# Patient Record
Sex: MALE
[De-identification: names, ages, dates, MRNs, and addresses within clinical notes are randomized per-mention and may not be internally consistent; named-entity substitution may affect disease eponyms.]

---

## 2017-01-28 ENCOUNTER — CHARTING TRANS (OUTPATIENT)
Dept: OTHER | Age: 57
End: 2017-01-28

## 2018-11-05 VITALS
OXYGEN SATURATION: 98 % | HEIGHT: 73 IN | SYSTOLIC BLOOD PRESSURE: 110 MMHG | DIASTOLIC BLOOD PRESSURE: 80 MMHG | HEART RATE: 91 BPM | RESPIRATION RATE: 16 BRPM

## 2018-11-20 ENCOUNTER — LAB ENCOUNTER (OUTPATIENT)
Dept: LAB | Age: 58
End: 2018-11-20
Attending: FAMILY MEDICINE
Payer: COMMERCIAL

## 2018-11-20 ENCOUNTER — OFFICE VISIT (OUTPATIENT)
Dept: FAMILY MEDICINE CLINIC | Facility: CLINIC | Age: 58
End: 2018-11-20
Payer: COMMERCIAL

## 2018-11-20 VITALS
RESPIRATION RATE: 16 BRPM | DIASTOLIC BLOOD PRESSURE: 52 MMHG | WEIGHT: 173 LBS | HEART RATE: 89 BPM | SYSTOLIC BLOOD PRESSURE: 98 MMHG | TEMPERATURE: 97 F | BODY MASS INDEX: 22.2 KG/M2 | HEIGHT: 74 IN

## 2018-11-20 DIAGNOSIS — R79.89 ELEVATED TSH: Primary | ICD-10-CM

## 2018-11-20 DIAGNOSIS — Z00.00 LABORATORY EXAMINATION ORDERED AS PART OF A ROUTINE GENERAL MEDICAL EXAMINATION: ICD-10-CM

## 2018-11-20 DIAGNOSIS — Z12.5 SCREENING FOR PROSTATE CANCER: ICD-10-CM

## 2018-11-20 DIAGNOSIS — R79.89 ELEVATED TSH: ICD-10-CM

## 2018-11-20 DIAGNOSIS — R20.2 NUMBNESS AND TINGLING OF LEFT HAND: ICD-10-CM

## 2018-11-20 DIAGNOSIS — R20.0 NUMBNESS AND TINGLING OF LEFT HAND: ICD-10-CM

## 2018-11-20 DIAGNOSIS — Z71.6 ENCOUNTER FOR SMOKING CESSATION COUNSELING: ICD-10-CM

## 2018-11-20 DIAGNOSIS — Z00.00 PHYSICAL EXAM, ANNUAL: Primary | ICD-10-CM

## 2018-11-20 DIAGNOSIS — Z13.89 SCREENING FOR GENITOURINARY CONDITION: ICD-10-CM

## 2018-11-20 PROCEDURE — 36415 COLL VENOUS BLD VENIPUNCTURE: CPT

## 2018-11-20 PROCEDURE — 82607 VITAMIN B-12: CPT

## 2018-11-20 PROCEDURE — 99386 PREV VISIT NEW AGE 40-64: CPT | Performed by: FAMILY MEDICINE

## 2018-11-20 PROCEDURE — 80061 LIPID PANEL: CPT

## 2018-11-20 PROCEDURE — 81001 URINALYSIS AUTO W/SCOPE: CPT

## 2018-11-20 PROCEDURE — 84481 FREE ASSAY (FT-3): CPT

## 2018-11-20 PROCEDURE — 80053 COMPREHEN METABOLIC PANEL: CPT

## 2018-11-20 PROCEDURE — 84443 ASSAY THYROID STIM HORMONE: CPT

## 2018-11-20 PROCEDURE — 85025 COMPLETE CBC W/AUTO DIFF WBC: CPT

## 2018-11-20 PROCEDURE — 84439 ASSAY OF FREE THYROXINE: CPT

## 2018-11-20 PROCEDURE — 84153 ASSAY OF PSA TOTAL: CPT

## 2018-11-20 NOTE — PROGRESS NOTES
Marcio Vaughan is a 62year old male who presents for a complete physical exam.   HPI:   Pt complains of of having on and off back pain issues. Will do some stretching exercises he will follow-up in the office of back pain persists.   Patient also compla EXCISION OF LESION/LIPOMA Right 8/19/2013    Performed by Rishi Maciel MD at [facility] MAIN OR   • HAND/FINGER SURGERY UNLISTED      27 y ago right thumb surgery   • KNEE SURGERY  2/2013   • OTHER SURGICAL HISTORY      Ear Surgery, Right ear after contusion apparent distress  SKIN: no rashes,no suspicious lesions  HEENT: atraumatic, normocephalic,ears and throat are clear  EYES:PERRLA, EOMI, conjunctiva are clear  NECK: supple,no adenopathy,  CHEST: no chest tenderness  BREAST: no dominant or suspicious mass maintenance, will check fasting Lipids, CMP, CBC and PSA. Pt up-to-date with screening colonoscopy. The patient indicates understanding of these issues and agrees to the plan. The patient is asked to return for CPX in 1 year.   Follow-up sooner to discuss

## 2018-11-20 NOTE — PATIENT INSTRUCTIONS
Healthy diet. Stay active. Start Chantix intake per directions. If you want to continue after starter pack call office for refills. Do Some back stretching exercises .    you can try  Advil 200 mg 2 tablets with breakfast lunch and dinner just for 7 day

## 2019-02-25 ENCOUNTER — OFFICE VISIT (OUTPATIENT)
Dept: FAMILY MEDICINE CLINIC | Facility: CLINIC | Age: 59
End: 2019-02-25
Payer: COMMERCIAL

## 2019-02-25 ENCOUNTER — HOSPITAL ENCOUNTER (OUTPATIENT)
Dept: GENERAL RADIOLOGY | Age: 59
Discharge: HOME | End: 2019-02-25
Attending: FAMILY MEDICINE
Payer: COMMERCIAL

## 2019-02-25 VITALS
WEIGHT: 180 LBS | TEMPERATURE: 97 F | HEART RATE: 62 BPM | BODY MASS INDEX: 23.1 KG/M2 | HEIGHT: 74 IN | DIASTOLIC BLOOD PRESSURE: 62 MMHG | RESPIRATION RATE: 16 BRPM | SYSTOLIC BLOOD PRESSURE: 110 MMHG

## 2019-02-25 DIAGNOSIS — G89.29 CHRONIC RIGHT-SIDED LOW BACK PAIN WITHOUT SCIATICA: Primary | ICD-10-CM

## 2019-02-25 DIAGNOSIS — G89.29 CHRONIC RIGHT-SIDED LOW BACK PAIN WITHOUT SCIATICA: ICD-10-CM

## 2019-02-25 DIAGNOSIS — M54.50 CHRONIC RIGHT-SIDED LOW BACK PAIN WITHOUT SCIATICA: Primary | ICD-10-CM

## 2019-02-25 DIAGNOSIS — M54.50 CHRONIC RIGHT-SIDED LOW BACK PAIN WITHOUT SCIATICA: ICD-10-CM

## 2019-02-25 PROCEDURE — 72110 X-RAY EXAM L-2 SPINE 4/>VWS: CPT | Performed by: FAMILY MEDICINE

## 2019-02-25 PROCEDURE — 99214 OFFICE O/P EST MOD 30 MIN: CPT | Performed by: FAMILY MEDICINE

## 2019-02-25 NOTE — PATIENT INSTRUCTIONS
Do x-ray of your lumbar spine. Schedule physical therapy at [facility]. Use Advil over-the-counter as needed.     Follow-up after physical therapy in the office for reevaluation

## 2019-02-25 NOTE — PROGRESS NOTES
Jonna Lyle is a 62year old male. cc low back pain   HPI:   Patient complains of having low back pain on and off for 3 years started after he was lifting a case of water. The pain is not constant.   It could be 8-9 out of 10 intensity then it tapers d adenopathy,  LUNGS: clear to auscultation  CARDIO: RRR without murmur  GI: good BS's,no masses, HSM or tenderness  EXTREMITIES: no cyanosis, clubbing or edema  Psychiatric - alert  and oriented x3, normal mood     ASSESSMENT AND PLAN:     Chronic right-lucia

## 2019-07-27 ENCOUNTER — APPOINTMENT (OUTPATIENT)
Dept: GENERAL RADIOLOGY | Age: 59
End: 2019-07-27
Attending: FAMILY MEDICINE
Payer: COMMERCIAL

## 2019-07-27 ENCOUNTER — HOSPITAL ENCOUNTER (OUTPATIENT)
Age: 59
Discharge: HOME | End: 2019-07-27
Attending: FAMILY MEDICINE
Payer: COMMERCIAL

## 2019-07-27 VITALS
RESPIRATION RATE: 18 BRPM | WEIGHT: 175 LBS | HEART RATE: 60 BPM | SYSTOLIC BLOOD PRESSURE: 119 MMHG | TEMPERATURE: 98 F | HEIGHT: 73 IN | DIASTOLIC BLOOD PRESSURE: 72 MMHG | BODY MASS INDEX: 23.19 KG/M2 | OXYGEN SATURATION: 97 %

## 2019-07-27 DIAGNOSIS — S81.011A LACERATION OF RIGHT KNEE, INITIAL ENCOUNTER: Primary | ICD-10-CM

## 2019-07-27 PROCEDURE — 12032 INTMD RPR S/A/T/EXT 2.6-7.5: CPT

## 2019-07-27 PROCEDURE — 99203 OFFICE O/P NEW LOW 30 MIN: CPT

## 2019-07-27 PROCEDURE — 73560 X-RAY EXAM OF KNEE 1 OR 2: CPT | Performed by: FAMILY MEDICINE

## 2019-07-27 PROCEDURE — 99213 OFFICE O/P EST LOW 20 MIN: CPT

## 2019-07-27 NOTE — ED PROVIDER NOTES
Patient Seen in: [facility]    History   Patient presents with:  Trauma    Stated Complaint: Cut knee possible stitches today    HPI    27-year-old male presents today for evaluation of her right anterior knee laceration th Physical Exam   Musculoskeletal:        Legs:      Patient is alert oriented in no distress  Examination of the right knee there is 5 cm gaping oblique laceration appreciated to the lateral  suprapatellar region with subcutaneous tissue and fat laceration Procedure note after discussing the risks, complications area of the laceration wound was infiltrated with 2% lidocaine without epinephrine. About 2 cc infiltrated locally.   Wound explored, there is a small foreign body likely small piece of dried grass p

## 2019-07-29 NOTE — ED NOTES
Pt and his wife call asking for pain meds for Knee LAC.  Pt states he has had trouble walking and has been using crutches at home due to the pain, states the stitches look good (denies any drainage, redness or streaking), and knee has swollen up to the size

## 2019-08-02 ENCOUNTER — APPOINTMENT (OUTPATIENT)
Dept: GENERAL RADIOLOGY | Facility: HOSPITAL | Age: 59
End: 2019-08-02
Attending: NURSE PRACTITIONER
Payer: COMMERCIAL

## 2019-08-02 ENCOUNTER — HOSPITAL ENCOUNTER (EMERGENCY)
Facility: HOSPITAL | Age: 59
Discharge: HOME | End: 2019-08-02
Attending: EMERGENCY MEDICINE
Payer: COMMERCIAL

## 2019-08-02 ENCOUNTER — TELEPHONE (OUTPATIENT)
Dept: FAMILY MEDICINE CLINIC | Facility: CLINIC | Age: 59
End: 2019-08-02

## 2019-08-02 VITALS
DIASTOLIC BLOOD PRESSURE: 77 MMHG | TEMPERATURE: 99 F | RESPIRATION RATE: 18 BRPM | SYSTOLIC BLOOD PRESSURE: 122 MMHG | HEART RATE: 64 BPM | BODY MASS INDEX: 23 KG/M2 | WEIGHT: 175 LBS | OXYGEN SATURATION: 97 %

## 2019-08-02 DIAGNOSIS — M25.461 FLUID IN RIGHT KNEE JOINT: Primary | ICD-10-CM

## 2019-08-02 LAB
BASOPHIL SYNOVIAL FLUID: 0 %
CRYSTALS: NEGATIVE
EOSINOPHIL SYNOVIAL FLUID: 0 %
LYMPH SYNOVIAL FLUID: 8 %
MON/MAC/HIST SYNOVIAL FLUID: 4 %
NEUTROPHIL SYNOVIAL FLUID: 88 % (ref ?–20)
RBC SYNOVIAL FLUID: ABNORMAL /MM3
TOTAL CELLS COUNTED: 100
WBC SYNOVIAL FLUID: ABNORMAL /MM3 (ref ?–150)

## 2019-08-02 PROCEDURE — 89050 BODY FLUID CELL COUNT: CPT | Performed by: EMERGENCY MEDICINE

## 2019-08-02 PROCEDURE — 87205 SMEAR GRAM STAIN: CPT | Performed by: EMERGENCY MEDICINE

## 2019-08-02 PROCEDURE — 87077 CULTURE AEROBIC IDENTIFY: CPT | Performed by: EMERGENCY MEDICINE

## 2019-08-02 PROCEDURE — 20610 DRAIN/INJ JOINT/BURSA W/O US: CPT | Performed by: NURSE PRACTITIONER

## 2019-08-02 PROCEDURE — 99284 EMERGENCY DEPT VISIT MOD MDM: CPT

## 2019-08-02 PROCEDURE — 87186 SC STD MICRODIL/AGAR DIL: CPT | Performed by: EMERGENCY MEDICINE

## 2019-08-02 PROCEDURE — 87070 CULTURE OTHR SPECIMN AEROBIC: CPT | Performed by: EMERGENCY MEDICINE

## 2019-08-02 PROCEDURE — 89051 BODY FLUID CELL COUNT: CPT | Performed by: EMERGENCY MEDICINE

## 2019-08-02 PROCEDURE — 89060 EXAM SYNOVIAL FLUID CRYSTALS: CPT | Performed by: EMERGENCY MEDICINE

## 2019-08-02 PROCEDURE — 20610 DRAIN/INJ JOINT/BURSA W/O US: CPT

## 2019-08-02 RX ORDER — LIDOCAINE HYDROCHLORIDE AND EPINEPHRINE 10; 10 MG/ML; UG/ML
5 INJECTION, SOLUTION INFILTRATION; PERINEURAL ONCE
Status: DISCONTINUED | OUTPATIENT
Start: 2019-08-02 | End: 2019-08-02

## 2019-08-02 NOTE — TELEPHONE ENCOUNTER
S/w wife. Pt was in urgent care last week for laceration with hatchet while chopping wood. rec'd 8 sutures. Reports swelling around knee is worse and notes yellowing of the skin 8 inches above and 8 inches below.   When asking if fever, she said she does

## 2019-08-02 NOTE — TELEPHONE ENCOUNTER
1. What are your symptoms? Went to [facility] for cut/trauma to knee states knee is still very swollen and yellow and tender does get cold and hot does have 8 stitches     2. How long have you been having these symptoms? Since sat.      3. Have you done a

## 2019-08-02 NOTE — ED NOTES
Right knee drainage and aspiration done by MD, RN and APN at the bedside. About 100ml of fluid aspirated.

## 2019-08-02 NOTE — ED INITIAL ASSESSMENT (HPI)
Pt states that he hit his knee with an ax last sat and received stitches.  3 hours after the accident pt states that he knee swelled and had to start using crutches

## 2019-08-02 NOTE — ED NOTES
I reviewed that chart and discussed the case. I have examined the patient and noted the patient states that that he hit his knee with an accident last Saturday and received stitches. He did have x-rays at that time.     Xr Knee (1 Or 2 Views), Right (cpt= Subsequent an 18-gauge needle was used under sterile conditions and approximately  100 cc of blood-tinged serous sanguinous fluid was obtained. .  The patient tolerated procedure without complication he felt significantly better was able to bend the knee wi

## 2019-08-02 NOTE — ED PROVIDER NOTES
Patient Seen in: [facility] Emergency Department    History   Patient presents with:  Lower Extremity Injury (musculoskeletal)  Swelling Edema (cardiovascular, metabolic)    Stated Complaint: pain and swelling to right knee s/p injury/suture placeme Musculoskeletal:        Knee swelling   Neurological: Negative. Hematological: Negative. Psychiatric/Behavioral: Negative. All other systems reviewed and are negative.       Positive for stated complaint: pain and swelling to right knee s/p injury/ The following orders were created for panel order CELL CT/DIF & CRYSTAL SYNOVIAL.   Procedure                               Abnormality         Status                     ---------                               -----------         ------ I have discussed with the patient and/or family the results of test, differential diagnosis, treatment plan, warning signs and symptoms which should prompt immediate return.   The patient and/or family expressed clear understanding of these instructions and

## 2019-08-05 ENCOUNTER — HOSPITAL ENCOUNTER (INPATIENT)
Facility: HOSPITAL | Age: 59
LOS: 2 days | Discharge: HOME | DRG: 487 | End: 2019-08-07
Attending: EMERGENCY MEDICINE | Admitting: HOSPITALIST
Payer: COMMERCIAL

## 2019-08-05 ENCOUNTER — TELEPHONE (OUTPATIENT)
Dept: FAMILY MEDICINE CLINIC | Facility: CLINIC | Age: 59
End: 2019-08-05

## 2019-08-05 DIAGNOSIS — M00.861 ARTHRITIS OF RIGHT KNEE DUE TO OTHER BACTERIA (HCC): Primary | ICD-10-CM

## 2019-08-05 DIAGNOSIS — M00.9 SEPTIC ARTHRITIS OF KNEE, RIGHT (HCC): ICD-10-CM

## 2019-08-05 LAB
ALBUMIN SERPL-MCNC: 2.8 G/DL (ref 3.4–5)
ALBUMIN/GLOB SERPL: 0.5 {RATIO} (ref 1–2)
ALP LIVER SERPL-CCNC: 81 U/L (ref 45–117)
ALT SERPL-CCNC: 21 U/L (ref 16–61)
ANION GAP SERPL CALC-SCNC: 4 MMOL/L (ref 0–18)
AST SERPL-CCNC: 33 U/L (ref 15–37)
BASOPHILS # BLD AUTO: 0.1 X10(3) UL (ref 0–0.2)
BASOPHILS NFR BLD AUTO: 0.9 %
BILIRUB SERPL-MCNC: 1.1 MG/DL (ref 0.1–2)
BUN BLD-MCNC: 19 MG/DL (ref 7–18)
BUN/CREAT SERPL: 22.6 (ref 10–20)
CALCIUM BLD-MCNC: 9 MG/DL (ref 8.5–10.1)
CHLORIDE SERPL-SCNC: 103 MMOL/L (ref 98–112)
CO2 SERPL-SCNC: 29 MMOL/L (ref 21–32)
CREAT BLD-MCNC: 0.84 MG/DL (ref 0.7–1.3)
CRP SERPL-MCNC: 7.66 MG/DL (ref ?–0.3)
DEPRECATED RDW RBC AUTO: 39 FL (ref 35.1–46.3)
EOSINOPHIL # BLD AUTO: 0.25 X10(3) UL (ref 0–0.7)
EOSINOPHIL NFR BLD AUTO: 2.4 %
ERYTHROCYTE [DISTWIDTH] IN BLOOD BY AUTOMATED COUNT: 11.9 % (ref 11–15)
GLOBULIN PLAS-MCNC: 5.2 G/DL (ref 2.8–4.4)
GLUCOSE BLD-MCNC: 97 MG/DL (ref 70–99)
HCT VFR BLD AUTO: 41.6 % (ref 39–53)
HGB BLD-MCNC: 14 G/DL (ref 13–17.5)
IMM GRANULOCYTES # BLD AUTO: 0.06 X10(3) UL (ref 0–1)
IMM GRANULOCYTES NFR BLD: 0.6 %
LYMPHOCYTES # BLD AUTO: 2.03 X10(3) UL (ref 1–4)
LYMPHOCYTES NFR BLD AUTO: 19.2 %
M PROTEIN MFR SERPL ELPH: 8 G/DL (ref 6.4–8.2)
MCH RBC QN AUTO: 29.7 PG (ref 26–34)
MCHC RBC AUTO-ENTMCNC: 33.7 G/DL (ref 31–37)
MCV RBC AUTO: 88.3 FL (ref 80–100)
MONOCYTES # BLD AUTO: 0.9 X10(3) UL (ref 0.1–1)
MONOCYTES NFR BLD AUTO: 8.5 %
NEUTROPHILS # BLD AUTO: 7.26 X10 (3) UL (ref 1.5–7.7)
NEUTROPHILS # BLD AUTO: 7.26 X10(3) UL (ref 1.5–7.7)
NEUTROPHILS NFR BLD AUTO: 68.4 %
OSMOLALITY SERPL CALC.SUM OF ELEC: 284 MOSM/KG (ref 275–295)
PLATELET # BLD AUTO: 370 10(3)UL (ref 150–450)
POTASSIUM SERPL-SCNC: 5 MMOL/L (ref 3.5–5.1)
RBC # BLD AUTO: 4.71 X10(6)UL (ref 4.3–5.7)
SED RATE-ML: 64 MM/HR (ref 0–12)
SODIUM SERPL-SCNC: 136 MMOL/L (ref 136–145)
WBC # BLD AUTO: 10.6 X10(3) UL (ref 4–11)

## 2019-08-05 PROCEDURE — 99222 1ST HOSP IP/OBS MODERATE 55: CPT | Performed by: HOSPITALIST

## 2019-08-05 RX ORDER — SODIUM PHOSPHATE, DIBASIC AND SODIUM PHOSPHATE, MONOBASIC 7; 19 G/133ML; G/133ML
1 ENEMA RECTAL ONCE AS NEEDED
Status: DISCONTINUED | OUTPATIENT
Start: 2019-08-05 | End: 2019-08-06

## 2019-08-05 RX ORDER — MORPHINE SULFATE 4 MG/ML
1 INJECTION, SOLUTION INTRAMUSCULAR; INTRAVENOUS EVERY 2 HOUR PRN
Status: DISCONTINUED | OUTPATIENT
Start: 2019-08-05 | End: 2019-08-06

## 2019-08-05 RX ORDER — SODIUM CHLORIDE 9 MG/ML
INJECTION, SOLUTION INTRAVENOUS CONTINUOUS
Status: ACTIVE | OUTPATIENT
Start: 2019-08-05 | End: 2019-08-06

## 2019-08-05 RX ORDER — SODIUM CHLORIDE 9 MG/ML
INJECTION, SOLUTION INTRAVENOUS CONTINUOUS
Status: DISCONTINUED | OUTPATIENT
Start: 2019-08-05 | End: 2019-08-07

## 2019-08-05 RX ORDER — CEFAZOLIN SODIUM/WATER 2 G/20 ML
2 SYRINGE (ML) INTRAVENOUS ONCE
Status: COMPLETED | OUTPATIENT
Start: 2019-08-05 | End: 2019-08-05

## 2019-08-05 RX ORDER — METOCLOPRAMIDE HYDROCHLORIDE 5 MG/ML
10 INJECTION INTRAMUSCULAR; INTRAVENOUS EVERY 8 HOURS PRN
Status: DISCONTINUED | OUTPATIENT
Start: 2019-08-05 | End: 2019-08-06

## 2019-08-05 RX ORDER — HYDROMORPHONE HYDROCHLORIDE 1 MG/ML
0.5 INJECTION, SOLUTION INTRAMUSCULAR; INTRAVENOUS; SUBCUTANEOUS EVERY 30 MIN PRN
Status: ACTIVE | OUTPATIENT
Start: 2019-08-05 | End: 2019-08-06

## 2019-08-05 RX ORDER — POLYETHYLENE GLYCOL 3350 17 G/17G
17 POWDER, FOR SOLUTION ORAL DAILY PRN
Status: DISCONTINUED | OUTPATIENT
Start: 2019-08-05 | End: 2019-08-06

## 2019-08-05 RX ORDER — CEFAZOLIN SODIUM/WATER 2 G/20 ML
2 SYRINGE (ML) INTRAVENOUS EVERY 8 HOURS
Status: DISCONTINUED | OUTPATIENT
Start: 2019-08-06 | End: 2019-08-07

## 2019-08-05 RX ORDER — HYDROCODONE BITARTRATE AND ACETAMINOPHEN 5; 325 MG/1; MG/1
2 TABLET ORAL EVERY 4 HOURS PRN
Status: DISCONTINUED | OUTPATIENT
Start: 2019-08-05 | End: 2019-08-06

## 2019-08-05 RX ORDER — HEPARIN SODIUM 5000 [USP'U]/ML
5000 INJECTION, SOLUTION INTRAVENOUS; SUBCUTANEOUS EVERY 8 HOURS SCHEDULED
Status: DISCONTINUED | OUTPATIENT
Start: 2019-08-05 | End: 2019-08-06

## 2019-08-05 RX ORDER — ONDANSETRON 2 MG/ML
4 INJECTION INTRAMUSCULAR; INTRAVENOUS EVERY 4 HOURS PRN
Status: DISCONTINUED | OUTPATIENT
Start: 2019-08-05 | End: 2019-08-06

## 2019-08-05 RX ORDER — BISACODYL 10 MG
10 SUPPOSITORY, RECTAL RECTAL
Status: DISCONTINUED | OUTPATIENT
Start: 2019-08-05 | End: 2019-08-06

## 2019-08-05 RX ORDER — MORPHINE SULFATE 4 MG/ML
2 INJECTION, SOLUTION INTRAMUSCULAR; INTRAVENOUS EVERY 2 HOUR PRN
Status: DISCONTINUED | OUTPATIENT
Start: 2019-08-05 | End: 2019-08-06

## 2019-08-05 RX ORDER — DOCUSATE SODIUM 100 MG/1
100 CAPSULE, LIQUID FILLED ORAL 2 TIMES DAILY
Status: DISCONTINUED | OUTPATIENT
Start: 2019-08-05 | End: 2019-08-06

## 2019-08-05 RX ORDER — ACETAMINOPHEN 325 MG/1
650 TABLET ORAL EVERY 4 HOURS PRN
Status: DISCONTINUED | OUTPATIENT
Start: 2019-08-05 | End: 2019-08-07

## 2019-08-05 RX ORDER — ACETAMINOPHEN 325 MG/1
650 TABLET ORAL EVERY 6 HOURS PRN
Status: DISCONTINUED | OUTPATIENT
Start: 2019-08-05 | End: 2019-08-06

## 2019-08-05 RX ORDER — ONDANSETRON 2 MG/ML
4 INJECTION INTRAMUSCULAR; INTRAVENOUS EVERY 6 HOURS PRN
Status: DISCONTINUED | OUTPATIENT
Start: 2019-08-05 | End: 2019-08-06

## 2019-08-05 RX ORDER — HYDROCODONE BITARTRATE AND ACETAMINOPHEN 5; 325 MG/1; MG/1
1 TABLET ORAL EVERY 4 HOURS PRN
Status: DISCONTINUED | OUTPATIENT
Start: 2019-08-05 | End: 2019-08-06

## 2019-08-05 RX ORDER — TEMAZEPAM 7.5 MG/1
7.5 CAPSULE ORAL NIGHTLY PRN
Status: DISCONTINUED | OUTPATIENT
Start: 2019-08-05 | End: 2019-08-06

## 2019-08-05 RX ORDER — MORPHINE SULFATE 4 MG/ML
4 INJECTION, SOLUTION INTRAMUSCULAR; INTRAVENOUS EVERY 2 HOUR PRN
Status: DISCONTINUED | OUTPATIENT
Start: 2019-08-05 | End: 2019-08-06

## 2019-08-05 NOTE — TELEPHONE ENCOUNTER
Call from earline/spouse-sts wants to update dr Apolonia Nation that pt does not want to go to ER now, feels sl better but has not taken immobilizer off to examine wound.    sts if he feels worse, will go to ER but otherwise wants to sched appt w dr Apolonia Nation

## 2019-08-05 NOTE — TELEPHONE ENCOUNTER
OK to see someone else if he wants,  otherwise call us Wednesday for evaluation. At any point if worsening symptoms go to ER.

## 2019-08-05 NOTE — TELEPHONE ENCOUNTER
Pt's wife called in regards to the ER visit he went to on 8/2/19. Pt's wife states that pt is not able to come back to work, since he had his knee drained; and he would need short term disability paperwork to be completed.   Please call pt's wife back as s

## 2019-08-05 NOTE — TELEPHONE ENCOUNTER
If wife thinks that patient's symptoms are worsening I would suggest to go to the ER back for reevaluation of her there. Definitely would need to have an appointment for evaluation here in the office and will need to have an work firm completed for him.

## 2019-08-05 NOTE — TELEPHONE ENCOUNTER
Lm for pt to C[facility]. I instructed them to call [phone] and have the on call provider paged. I advised them this is of an urgent nature and I need them to call [facility]. Please advise pt his appt for tomorrow is CANCELLED.  Please see additional note below p

## 2019-08-05 NOTE — TELEPHONE ENCOUNTER
Per dr Kishore Cardozo addition to comments below, adds since initially worse, mult visits to UC and ER since injury 7/27 and no significant improvement, may need ortho or infectious disease recommendations, which ER can facilitate quickly.    States ER to a

## 2019-08-05 NOTE — TELEPHONE ENCOUNTER
Call back to earline/spouse-advised of dr jeffers's comments/recommendations. Earline and pt req appt tomorrow. State they will try to bring short term disability forms to appt.  Reinforced forms usually need review and time to complete-usually not

## 2019-08-05 NOTE — TELEPHONE ENCOUNTER
In reviewing chart/labs from synovial fluid drain and speaking with PCP, patient needs to go to the ER for further eval--may need IV antibiotics, orthopedic consult.

## 2019-08-05 NOTE — TELEPHONE ENCOUNTER
Spoke to Mohit NP. Per Mohit, Dr. Elliott Rubin wants patient to go to ER to evaluate knee. Appt tomorrow with our office was cancelled. Mohit states nurses left message for patient to page me since they were unable to contact him.   At 1750, I hadn't

## 2019-08-05 NOTE — TELEPHONE ENCOUNTER
Call to earline/spouse-listed on hipaa consent-sts pt continues to have redness, significant discomfort, chills (no fever) difficulty w ambulation, redness, swelling of anterior right knee and unable to bear weight  sts did not receive any abx or pain med

## 2019-08-06 ENCOUNTER — ANESTHESIA (OUTPATIENT)
Dept: SURGERY | Facility: HOSPITAL | Age: 59
End: 2019-08-06

## 2019-08-06 ENCOUNTER — ANESTHESIA EVENT (OUTPATIENT)
Dept: SURGERY | Facility: HOSPITAL | Age: 59
End: 2019-08-06

## 2019-08-06 LAB
ANION GAP SERPL CALC-SCNC: 5 MMOL/L (ref 0–18)
BASOPHIL SYNOVIAL FLUID: 0 %
BASOPHILS # BLD AUTO: 0.08 X10(3) UL (ref 0–0.2)
BASOPHILS NFR BLD AUTO: 0.8 %
BUN BLD-MCNC: 16 MG/DL (ref 7–18)
BUN/CREAT SERPL: 21.1 (ref 10–20)
CALCIUM BLD-MCNC: 8.5 MG/DL (ref 8.5–10.1)
CHLORIDE SERPL-SCNC: 106 MMOL/L (ref 98–112)
CO2 SERPL-SCNC: 27 MMOL/L (ref 21–32)
CREAT BLD-MCNC: 0.76 MG/DL (ref 0.7–1.3)
DEPRECATED RDW RBC AUTO: 38.6 FL (ref 35.1–46.3)
EOSINOPHIL # BLD AUTO: 0.23 X10(3) UL (ref 0–0.7)
EOSINOPHIL NFR BLD AUTO: 2.2 %
EOSINOPHIL SYNOVIAL FLUID: 0 %
ERYTHROCYTE [DISTWIDTH] IN BLOOD BY AUTOMATED COUNT: 11.9 % (ref 11–15)
GLUCOSE BLD-MCNC: 105 MG/DL (ref 70–99)
HCT VFR BLD AUTO: 39.3 % (ref 39–53)
HGB BLD-MCNC: 13 G/DL (ref 13–17.5)
IMM GRANULOCYTES # BLD AUTO: 0.06 X10(3) UL (ref 0–1)
IMM GRANULOCYTES NFR BLD: 0.6 %
LYMPH SYNOVIAL FLUID: 6 %
LYMPHOCYTES # BLD AUTO: 2.26 X10(3) UL (ref 1–4)
LYMPHOCYTES NFR BLD AUTO: 21.4 %
MCH RBC QN AUTO: 29.4 PG (ref 26–34)
MCHC RBC AUTO-ENTMCNC: 33.1 G/DL (ref 31–37)
MCV RBC AUTO: 88.9 FL (ref 80–100)
MON/MAC/HIST SYNOVIAL FLUID: 3 %
MONOCYTES # BLD AUTO: 0.92 X10(3) UL (ref 0.1–1)
MONOCYTES NFR BLD AUTO: 8.7 %
NEUTROPHIL SYNOVIAL FLUID: 91 % (ref ?–20)
NEUTROPHILS # BLD AUTO: 7.02 X10 (3) UL (ref 1.5–7.7)
NEUTROPHILS # BLD AUTO: 7.02 X10(3) UL (ref 1.5–7.7)
NEUTROPHILS NFR BLD AUTO: 66.3 %
OSMOLALITY SERPL CALC.SUM OF ELEC: 288 MOSM/KG (ref 275–295)
PLATELET # BLD AUTO: 342 10(3)UL (ref 150–450)
POTASSIUM SERPL-SCNC: 4 MMOL/L (ref 3.5–5.1)
RBC # BLD AUTO: 4.42 X10(6)UL (ref 4.3–5.7)
SODIUM SERPL-SCNC: 138 MMOL/L (ref 136–145)
TOTAL CELLS COUNTED: 100
WBC # BLD AUTO: 10.6 X10(3) UL (ref 4–11)

## 2019-08-06 PROCEDURE — 0S9C00Z DRAINAGE OF RIGHT KNEE JOINT WITH DRAINAGE DEVICE, OPEN APPROACH: ICD-10-PCS | Performed by: ORTHOPAEDIC SURGERY

## 2019-08-06 PROCEDURE — 99232 SBSQ HOSP IP/OBS MODERATE 35: CPT | Performed by: INTERNAL MEDICINE

## 2019-08-06 RX ORDER — HYDROMORPHONE HYDROCHLORIDE 1 MG/ML
INJECTION, SOLUTION INTRAMUSCULAR; INTRAVENOUS; SUBCUTANEOUS
Status: COMPLETED
Start: 2019-08-06 | End: 2019-08-06

## 2019-08-06 RX ORDER — HYDROCODONE BITARTRATE AND ACETAMINOPHEN 5; 325 MG/1; MG/1
1 TABLET ORAL EVERY 6 HOURS PRN
Status: DISCONTINUED | OUTPATIENT
Start: 2019-08-06 | End: 2019-08-06

## 2019-08-06 RX ORDER — ONDANSETRON 2 MG/ML
4 INJECTION INTRAMUSCULAR; INTRAVENOUS EVERY 6 HOURS PRN
Status: DISCONTINUED | OUTPATIENT
Start: 2019-08-06 | End: 2019-08-07

## 2019-08-06 RX ORDER — SODIUM CHLORIDE, SODIUM LACTATE, POTASSIUM CHLORIDE, CALCIUM CHLORIDE 600; 310; 30; 20 MG/100ML; MG/100ML; MG/100ML; MG/100ML
INJECTION, SOLUTION INTRAVENOUS CONTINUOUS
Status: DISCONTINUED | OUTPATIENT
Start: 2019-08-06 | End: 2019-08-06 | Stop reason: HOSPADM

## 2019-08-06 RX ORDER — KETOROLAC TROMETHAMINE 30 MG/ML
30 INJECTION, SOLUTION INTRAMUSCULAR; INTRAVENOUS EVERY 6 HOURS PRN
Status: DISCONTINUED | OUTPATIENT
Start: 2019-08-06 | End: 2019-08-07

## 2019-08-06 RX ORDER — POLYETHYLENE GLYCOL 3350 17 G/17G
17 POWDER, FOR SOLUTION ORAL DAILY PRN
Status: DISCONTINUED | OUTPATIENT
Start: 2019-08-06 | End: 2019-08-07

## 2019-08-06 RX ORDER — SODIUM PHOSPHATE, DIBASIC AND SODIUM PHOSPHATE, MONOBASIC 7; 19 G/133ML; G/133ML
1 ENEMA RECTAL ONCE AS NEEDED
Status: DISCONTINUED | OUTPATIENT
Start: 2019-08-06 | End: 2019-08-07

## 2019-08-06 RX ORDER — NALOXONE HYDROCHLORIDE 0.4 MG/ML
80 INJECTION, SOLUTION INTRAMUSCULAR; INTRAVENOUS; SUBCUTANEOUS AS NEEDED
Status: DISCONTINUED | OUTPATIENT
Start: 2019-08-06 | End: 2019-08-06 | Stop reason: HOSPADM

## 2019-08-06 RX ORDER — METOCLOPRAMIDE HYDROCHLORIDE 5 MG/ML
10 INJECTION INTRAMUSCULAR; INTRAVENOUS AS NEEDED
Status: DISCONTINUED | OUTPATIENT
Start: 2019-08-06 | End: 2019-08-06 | Stop reason: HOSPADM

## 2019-08-06 RX ORDER — HYDROMORPHONE HYDROCHLORIDE 1 MG/ML
0.4 INJECTION, SOLUTION INTRAMUSCULAR; INTRAVENOUS; SUBCUTANEOUS EVERY 5 MIN PRN
Status: DISCONTINUED | OUTPATIENT
Start: 2019-08-06 | End: 2019-08-06 | Stop reason: HOSPADM

## 2019-08-06 RX ORDER — CEFAZOLIN SODIUM 1 G/3ML
INJECTION, POWDER, FOR SOLUTION INTRAMUSCULAR; INTRAVENOUS
Status: DISCONTINUED | OUTPATIENT
Start: 2019-08-06 | End: 2019-08-06

## 2019-08-06 RX ORDER — KETOROLAC TROMETHAMINE 30 MG/ML
15 INJECTION, SOLUTION INTRAMUSCULAR; INTRAVENOUS EVERY 6 HOURS PRN
Status: DISCONTINUED | OUTPATIENT
Start: 2019-08-06 | End: 2019-08-06 | Stop reason: HOSPADM

## 2019-08-06 RX ORDER — DOCUSATE SODIUM 100 MG/1
100 CAPSULE, LIQUID FILLED ORAL 2 TIMES DAILY
Status: DISCONTINUED | OUTPATIENT
Start: 2019-08-06 | End: 2019-08-07

## 2019-08-06 RX ORDER — BISACODYL 10 MG
10 SUPPOSITORY, RECTAL RECTAL
Status: DISCONTINUED | OUTPATIENT
Start: 2019-08-06 | End: 2019-08-07

## 2019-08-06 RX ORDER — ENOXAPARIN SODIUM 100 MG/ML
40 INJECTION SUBCUTANEOUS DAILY
Status: DISCONTINUED | OUTPATIENT
Start: 2019-08-07 | End: 2019-08-07

## 2019-08-06 RX ORDER — KETOROLAC TROMETHAMINE 30 MG/ML
30 INJECTION, SOLUTION INTRAMUSCULAR; INTRAVENOUS EVERY 6 HOURS PRN
Status: DISCONTINUED | OUTPATIENT
Start: 2019-08-06 | End: 2019-08-06 | Stop reason: HOSPADM

## 2019-08-06 RX ORDER — HYDROCODONE BITARTRATE AND ACETAMINOPHEN 5; 325 MG/1; MG/1
1 TABLET ORAL EVERY 6 HOURS PRN
Status: DISCONTINUED | OUTPATIENT
Start: 2019-08-06 | End: 2019-08-07

## 2019-08-06 RX ORDER — HYDROCODONE BITARTRATE AND ACETAMINOPHEN 5; 325 MG/1; MG/1
2 TABLET ORAL AS NEEDED
Status: DISCONTINUED | OUTPATIENT
Start: 2019-08-06 | End: 2019-08-06 | Stop reason: HOSPADM

## 2019-08-06 RX ORDER — KETOROLAC TROMETHAMINE 15 MG/ML
15 INJECTION, SOLUTION INTRAMUSCULAR; INTRAVENOUS EVERY 6 HOURS PRN
Status: DISCONTINUED | OUTPATIENT
Start: 2019-08-06 | End: 2019-08-07

## 2019-08-06 RX ORDER — HYDROMORPHONE HYDROCHLORIDE 1 MG/ML
INJECTION, SOLUTION INTRAMUSCULAR; INTRAVENOUS; SUBCUTANEOUS
Status: DISCONTINUED
Start: 2019-08-06 | End: 2019-08-06 | Stop reason: WASHOUT

## 2019-08-06 RX ORDER — ONDANSETRON 2 MG/ML
4 INJECTION INTRAMUSCULAR; INTRAVENOUS AS NEEDED
Status: DISCONTINUED | OUTPATIENT
Start: 2019-08-06 | End: 2019-08-06 | Stop reason: HOSPADM

## 2019-08-06 RX ORDER — HYDROCODONE BITARTRATE AND ACETAMINOPHEN 5; 325 MG/1; MG/1
2 TABLET ORAL EVERY 6 HOURS PRN
Status: DISCONTINUED | OUTPATIENT
Start: 2019-08-06 | End: 2019-08-07

## 2019-08-06 RX ORDER — MEPERIDINE HYDROCHLORIDE 25 MG/ML
12.5 INJECTION INTRAMUSCULAR; INTRAVENOUS; SUBCUTANEOUS AS NEEDED
Status: DISCONTINUED | OUTPATIENT
Start: 2019-08-06 | End: 2019-08-06 | Stop reason: HOSPADM

## 2019-08-06 RX ORDER — HYDROCODONE BITARTRATE AND ACETAMINOPHEN 5; 325 MG/1; MG/1
1 TABLET ORAL AS NEEDED
Status: DISCONTINUED | OUTPATIENT
Start: 2019-08-06 | End: 2019-08-06 | Stop reason: HOSPADM

## 2019-08-06 NOTE — ED PROVIDER NOTES
Patient Seen in: [facility] Emergency Department    History   Patient presents with:  Lower Extremity Injury (musculoskeletal)    Stated Complaint: R knee pain and swelling     HPI    Adela Louis is a 57-year-old male who comes in today following initial All other systems reviewed and negative except as noted above.     Physical Exam     ED Triage Vitals [08/05/19 2030]   /73   Pulse 84   Resp 18   Temp 97.4 °F (36.3 °C)   Temp src Temporal   SpO2 95 %   O2 Device None (Room air)       Current:/ Procedure                               Abnormality         Status                     ---------                               -----------         ------                     CBC W/ DIFFERENTIAL[[id number]]                              Final result Patient comes in after positive culture result from a tap that occurred a few days ago in the emergency room. Cyndi Ast throughout on his culture, along with white blood cell count seen in the synovial fluid.   Patient was called today he is h

## 2019-08-06 NOTE — CM/SW NOTE
Received call from [facility]--patient's deductible is $1750. oo of which patient has met $872.21--once he has met this, coverage is at 80%. Out of pocket is $3650. oo (has met $872.21)--when this met 100% covered

## 2019-08-06 NOTE — ED INITIAL ASSESSMENT (HPI)
Pt presents to ED with complaint of R knee swelling. Pt reports he was seen in the ED and had an arthrocentesis done and received a call to return to ED due to elevated WBCs.  Pt reports worsening swelling and pain

## 2019-08-06 NOTE — CM/SW NOTE
sw spoke to [facility] they will need to know if plan is for home, teach/train or in office. SW will need final iv abx orders to further determine plan with pt.  Will need to update [facility] once plan is determined

## 2019-08-06 NOTE — H&P
[facility]  History and Physical     Frances Mata Patient Status:  Emergency    3/16/1960 MRN [id number]   Location [facility] Attending Liliana Fregoso MD   Hosp Day # 0 PCP Jose A Hernandez MD     Chief Co Grandfather         MI 76   • Other (Other) Sister         obese, cocaine overdose   • Other (Other) Brother         colon polyp        Allergies: No Known Allergies    Medications:    No current facility-administered medications on file prior to encounter 1. Septic arthritis of the rt knee- will continue on IV abx and pain medication. Ortho on consult.       Quality:  · DVT Prophylaxis: Heparin  · CODE status: Full  · Barron: N/A    Plan of care discussed with pt at bedside    LUIS Bosch

## 2019-08-06 NOTE — CONSULTS
[facility]    Report of Consultation    Eloylottie Galeana Patient Status:  Inpatient    3/16/1960 MRN [id number]   [facility] 3SW-A Attending Romario Rodriguez MD   Hosp Day # 1 PCP Renato Howe MD     Date of Admission:  2019 Used    Alcohol use: No      Frequency: Never      Binge frequency: Never    Drug use: No          Current Medications:    Current Facility-Administered Medications:  ondansetron HCl (ZOFRAN) injection 4 mg 4 mg Intravenous Q4H PRN   0.9% NaCl infusion  In nad  RLE - anterolateral knee laceration with sutures intact. Effusion present. No pain with short arcs, but does have limited motion due to swelling, with pain at terminal motion.  Distally nvi    Results:     Laboratory Data:  Lab Results   Component Va

## 2019-08-06 NOTE — PLAN OF CARE
Ortho plan of care:    Received consult from Dr. Reginald Phan at [address] [place] [address].  Per EMR, patient had R knee tapped in ED on 8/2/19. 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   He was instructed to come back to ED, and is being admi

## 2019-08-06 NOTE — CONSULTS
[address] Patient Status:  Inpatient    3/16/1960 MRN [id number]   [facility] 3SW-A Attending Sophie Champagne MD   [facility] Day # 1 PCP Yousif Ku Current Facility-Administered Medications:   •  ondansetron HCl (ZOFRAN) injection 4 mg, 4 mg, Intravenous, Q4H PRN  •  0.9% NaCl infusion, , Intravenous, Continuous  •  Heparin Sodium (Porcine) 5000 UNIT/ML injection 5,000 Units, 5,000 Units, Subcutaneous Resp:  [18] 18  BP: (103-124)/(61-76) 103/61  HEENT: Moist mucous membranes. Extraocular muscles are intact. Neck: No lymphadenopathy. supple, no masses  Respiratory: Clear to auscultation bilaterally. No wheezes. No rhonchi.   Cardiovascular: S1, S2.  Reg Specimen Collected: 08/02/19  4:05 PM Last Resulted: 08/05/19  6:36 AM         Collected:  8/2/2019  4:15 PM Status:  Final result    Ref Range & Units 8/2/19  4:15 PM   WBC Synovial <=150 /mm3 30,330High     RBC Synovial /mm3 40,000    Source Crystals  Ri

## 2019-08-06 NOTE — CM/SW NOTE
[facility] has been notified that pt is agreeable to teach/train. They have received approval from insurance. Will need to coordinate with [facility] on teach/train visit once DC is known.

## 2019-08-06 NOTE — CM/SW NOTE
sw met with pt and dtr at bedside. pts dtr is a RN and is willing to help administer abx at home for him depending on frequence of drug; otherwise pt states he will be able to self administer or have other help at home.  He would be interested in teach/tang

## 2019-08-06 NOTE — PAYOR COMM NOTE
--------------  ADMISSION REVIEW     Payor: [facility]  Subscriber #:  [id number]  Authorization Number: [id number]    Admit date: 8/5/19  Admit time: 2312       Patient Seen in: [facility] Emergency Department    History   Patient present well-nourished. No distress. HENT:   Head: Normocephalic and atraumatic. Neck: Normal range of motion. Cardiovascular: Normal rate, regular rhythm, normal heart sounds and intact distal pulses.    Pulmonary/Chest: Effort normal and breath sounds betzaida diagnosis)    Disposition:  Admit  8/5/2019  9:55 pm          [facility]  History and Physical     Chief Complaint: rt knee pain and swelling    History of Present Illness: Alessia Bauer is a 61year old male with no significant past medical hx rhonchi. Cardiovascular: S1, S2. Regular rate and rhythm. No murmurs, rubs or gallops. Equal pulses. Chest and Back: No tenderness or deformity. Abdomen: Soft, nontender, nondistended. Positive bowel sounds. No rebound, guarding or organomegaly.   Dominique Scheuermann 08/06/19  0515   WBC 10.6 10.6   HGB 14.0 13.0   MCV 88.3 88.9   .0 342. 0              Recent Labs   Lab 08/05/19  2109 08/06/19 0515   GLU 97 105*   BUN 19* 16   CREATSERUM 0.84 0.76   GFRAA 111 115   GFRNAA 96 100   CA 9.0 8.5   ALB 2.8*  --    N 8/6/2019 1420 Given 0.4 mg Intravenous     8/6/2019 1415 Given 0.4 mg Intravenous     8/6/2019 1410 Given 0.4 mg Intravenous       0.9% NaCl infusion     Date Action Dose Route     8/5/2019 2330 New Bag (none) Intravenous       0.9% NaCl infusion     Date

## 2019-08-06 NOTE — BRIEF OP NOTE
Pre-Operative Diagnosis: Septic arthritis of knee, right (HCC) [M00.9]     Post-Operative Diagnosis: Septic arthritis of knee, right ([address] [M00.9]      Procedure Performed:   Procedure(s):  right knee arthrotomy, irrigation and debridement    Surgeon(s) an

## 2019-08-06 NOTE — ANESTHESIA PREPROCEDURE EVALUATION
PRE-OP EVALUATION    Patient Name: Shanta Lanier    Pre-op Diagnosis: Septic arthritis of knee, right ([address] [M00.9]    Procedure(s):  right knee arthrotomy and incision and drainage     Surgeon(s) and Role:     Josphine Dakins, MD - Primary    Pre-op vit rectal suppository 10 mg 10 mg Rectal Daily PRN   FLEET ENEMA (FLEET) 7-19 GM/118ML enema 133 mL 1 enema Rectal Once PRN   temazepam (RESTORIL) cap 7.5 mg 7.5 mg Oral Nightly PRN   ceFAZolin sodium (ANCEF/KEFZOL) 2 GM/20ML premix IV syringe 2 g 2 g Ether Sessions MCHC 33.1 08/06/2019    RDW 11.9 08/06/2019    .0 08/06/2019     Lab Results   Component Value Date     08/06/2019    K 4.0 08/06/2019     08/06/2019    CO2 27.0 08/06/2019    BUN 16 08/06/2019    CREATSERUM 0.76 08/06/2019     (H

## 2019-08-06 NOTE — PROGRESS NOTES
[facility]  Progress Note     Julio Stratton Patient Status:  Inpatient    3/16/1960 MRN [id number]   [facility] 3SW-A Attending Smith Lyman MD   Hosp Day # 1 PCP Larissa Humphrey MD     Chief Complaint: knee pain with moveme Units Subcutaneous [facility]   • docusate sodium  100 mg Oral BID   • ceFAZolin  2 g Intravenous Q8H       ASSESSMENT / PLAN:     1. Septic joint 2/2 to injury  1. Cont IV anx  2. Plan to OR this after for I&D  3. Ortho and ID rec appreciated   4.  Cont pain

## 2019-08-06 NOTE — ED PROVIDER NOTES
The patient's case was discussed with me by physician's assistant Kamari Goldstein. I interviewed and examined the patient. There is a significant recurrent right knee effusion. The patient was tapped a couple of days ago and had a positive culture.   Cultu

## 2019-08-06 NOTE — PLAN OF CARE
Right knee with sutures, slight red, edema. Cap refill < 3 seconds to rle. Rle elevated on pillow. Surgeon at bedside. Patient and daughter verbalize understanding plan of care.   Instructed patient and daughter to call for all asst needed, discomfort f

## 2019-08-06 NOTE — ANESTHESIA POSTPROCEDURE EVALUATION
[facility] Patient Status:  Inpatient   Age/Gender 61year old male MRN [id number]   [facility] Attending Juno Plummer MD   [facility] Day # 1 PCP Rufus Hatchet, MD       Anesthesia Post-op Note    Procedure(s)

## 2019-08-06 NOTE — PLAN OF CARE
Patient admitted from the ER with knee pain. Sutures in the knee prior to admission. Some swelling. A&Ox4. Denies pain at this time. VSS. RA. NPO. Ancef given in ER. Heparin. SCD to LLE. Immobilizer. Afebrile. Fall protocol initiated.  Voiding without diffi

## 2019-08-07 VITALS
TEMPERATURE: 98 F | RESPIRATION RATE: 18 BRPM | HEIGHT: 73 IN | SYSTOLIC BLOOD PRESSURE: 120 MMHG | HEART RATE: 63 BPM | BODY MASS INDEX: 23.19 KG/M2 | OXYGEN SATURATION: 96 % | DIASTOLIC BLOOD PRESSURE: 61 MMHG | WEIGHT: 175 LBS

## 2019-08-07 PROCEDURE — 02HV33Z INSERTION OF INFUSION DEVICE INTO SUPERIOR VENA CAVA, PERCUTANEOUS APPROACH: ICD-10-PCS | Performed by: INTERNAL MEDICINE

## 2019-08-07 PROCEDURE — 99232 SBSQ HOSP IP/OBS MODERATE 35: CPT | Performed by: INTERNAL MEDICINE

## 2019-08-07 PROCEDURE — B548ZZA ULTRASONOGRAPHY OF SUPERIOR VENA CAVA, GUIDANCE: ICD-10-PCS | Performed by: INTERNAL MEDICINE

## 2019-08-07 RX ORDER — HYDROCODONE BITARTRATE AND ACETAMINOPHEN 5; 325 MG/1; MG/1
2 TABLET ORAL EVERY 6 HOURS PRN
Status: DISCONTINUED | OUTPATIENT
Start: 2019-08-07 | End: 2019-08-07

## 2019-08-07 RX ORDER — SODIUM CHLORIDE 0.9 % (FLUSH) 0.9 %
10 SYRINGE (ML) INJECTION AS NEEDED
Status: DISCONTINUED | OUTPATIENT
Start: 2019-08-07 | End: 2019-08-07

## 2019-08-07 RX ORDER — HYDROCODONE BITARTRATE AND ACETAMINOPHEN 5; 325 MG/1; MG/1
1-2 TABLET ORAL EVERY 6 HOURS PRN
Qty: 15 TABLET | Refills: 0 | Status: ON HOLD | OUTPATIENT
Start: 2019-08-07 | End: 2019-08-24

## 2019-08-07 NOTE — PROGRESS NOTES
Spoke with Dr. Gonzalez Never, ok to pull drain after lunch today. Pt does not need knee immobilizer after drain is pulled. Place clean dry dressing to surgical site.

## 2019-08-07 NOTE — CM/SW NOTE
Met with pt to discuss DC planning. Pt's wife and children also present with pt's consent. Discussed arrangements in progress for [facility] teach and train for IV abx.   Also reviewed PT recommendation for Hilda Barrientos PT for home safety eval.  Discussed concerns that

## 2019-08-07 NOTE — PROGRESS NOTES
SPoke with Dr. Glo Wang, informed him that pt's daughter wants pt to go home after PICC line placed. Ok to give rocephin now instead of 1400 ancef per Dr. Glo Wang. Will contact  to set up home IV abx with [facility]. Will page Dr. Magaly Palacios now.

## 2019-08-07 NOTE — PLAN OF CARE
A&O x 4. VSS. On RA. Right knee pain well controlled with Wheeler/Toradol PRN. Ace wrap to RLE C/D/I. KI in place and extremity elevated on  pillows. Relivac drain patent and draining small amount of sanguinous drainage.  Up with min assist and crutches to ba

## 2019-08-07 NOTE — PROGRESS NOTES
[address] [facility] Patient Status:  Inpatient    3/16/1960 MRN [id number]   [facility] 3SW-A Attending Juno Plummer MD   Hosp Day # 2 PCP Rufus Hatchet, MD         S:  Patient doing well. Knee feels better.     O:  Bl

## 2019-08-07 NOTE — CM/SW NOTE
Received call from Javan Ram with [facility] requesting update to DC plan. Per MD notes, anticipate ceftriaxone daily with possible DC tomorrow. PT eval is pending. SW to follow for their recommendations.   [facility] plan at this time is for teach and train at [facility]

## 2019-08-07 NOTE — PHYSICAL THERAPY NOTE
PHYSICAL THERAPY QUICK EVALUATION - INPATIENT    Room Number: 365/365-A  Evaluation Date: 8/7/2019  Presenting Problem: S/p Right knee Arthrotomy - I & D on 08/06/19  Physician Order: PT Eval and Treat    Problem List  Principal Problem:    Arthritis of STRENGTH ASSESSMENT  Upper extremity ROM and strength are within functional limits     Lower extremity ROM is within functional limits except for the following: Right knee ROM limited S/p Right knee Arthrotomy - I & D on 08/06/19    Lower extremity strengt with supervision, Instructed in isometric quads exercises & handouts issued for improving ROM once ok with MD.    Patient End of Session: Up in chair;Needs met;Call light within reach;RN aware of session/findings; All patient questions and concerns Chantel Aeljandre

## 2019-08-07 NOTE — OPERATIVE REPORT
[facility]    PATIENT'S NAME: Huel Curling   ATTENDING PHYSICIAN: Alana Bloom M.D.    OPERATING PHYSICIAN: Parker Ochoa MD   PATIENT ACCOUNT#:   [id number]    LOCATION:  [address] [place] [address]  MEDICAL RECORD #:   [id number]       DATE OF BIRTH:  [birth year] which was likely deep in nature. I discussed doing an open arthrotomy versus arthroscopic washout, however an arthrotomy was more appropriate given his laceration.  I discussed the risks of surgery including, but not limited to, bleeding, persistence of in lateral femoral condyle by flexing and extending the knee. There was no significant defect or injury in this area.   We then irrigated the knee thoroughly with 6 L of fluid and used a curette over part of the suprapatellar pouch to remove some inflamed syn

## 2019-08-07 NOTE — PROGRESS NOTES
Spoke with Dr. Harvin Dandy, she will fill out pt's FMLA paperwork lucero when she rounds. Spoke with Dr. Kym Trujillo, pt to f/u in 1 week in office, he can fill out paperwork then. Pt not to go back to work from ortho standpoint at least  2 weeks per Dr. Kym Trujillo.

## 2019-08-07 NOTE — PAYOR COMM NOTE
--------------  ADMISSION REVIEW     [facility]  Subscriber #:  [id number]  Authorization Number: [id number]    ED Provider Notes      Patient Seen in: [facility] Emergency Department    History   Patient presents with:  Monique Beaver Physical Exam   Constitutional: He is oriented to person, place, and time. He appears well-developed and well-nourished. No distress. HENT:   Head: Normocephalic and atraumatic. Neck: Normal range of motion.    Cardiovascular: Normal rate, regular rhyth RAINBOW DRAW LAVENDER   RAINBOW DRAW LIGHT GREEN   RAINBOW DRAW GOLD   BLOOD CULTURE   BLOOD CULTURE   CBC W/ DIFFERENTIAL          Xr Knee (1 Or 2 Views), Right (cpt=73560)    Result Date: 7/27/2019  PROCEDURE:  XR KNEE (1 OR 2 VIEWS), RIGHT (CPT=73560) History of Present Illness: Jennifer Cooley is a 61year old male with no significant past medical hx a week ago this past Saturday was cutting some wood and cut the lateral aspect of his rt knee. Pt came to the urgent care where the stitched the knee.  Pt CO2 29.0   ALKPHO 81   AST 33   ALT 21   BILT 1.1   TP 8.0       Estimated Creatinine Clearance: 106.3 mL/min (based on SCr of 0.84 mg/dL). No results for input(s): PTP, INR in the last 168 hours.     No results for input(s): TROP, CK in the last 168 brannon Specimen: right knee synovial fluid sent for culture, cell count/diff, crystals     Estimated Blood Loss: Blood Output: 5 mL (8/6/2019  1:16 PM)            Contains abnormal data BODY FLUID CULT,AEROBIC AND ANAEROBIC   Order: [id number]   Collected:  8/6/20

## 2019-08-07 NOTE — PROGRESS NOTES
[facility]  Progress Note     Ha Gutierrez Patient Status:  Inpatient    3/16/1960 MRN [id number]   [facility] 3SW-A Attending Ryan Goldstein MD   Hosp Day # 2 PCP Dar Elias MD     Chief Complaint: POD 1    S: Patient w cefTRIAXone  2 g Intravenous Q24H   • docusate sodium  100 mg Oral BID   • enoxaparin  40 mg Subcutaneous Daily   • ceFAZolin  2 g Intravenous Q8H       ASSESSMENT / PLAN:     1. Septic joint 2/2 to injury s/p I&D POD 1  1.  Cont IV abx, plan for PICC and 4

## 2019-08-07 NOTE — PROGRESS NOTES
Pt discharged home at this time, taken via w/c to lobby by PCT. Pt has all belongings. Scripts give to pt for norco and rocephin. Pt going home with wife. PICC line remains intact. Pt to f/u tomorrow in [facility] office to learn administration of IV abx.

## 2019-08-07 NOTE — OCCUPATIONAL THERAPY NOTE
OCCUPATIONAL THERAPY QUICK EVALUATION - INPATIENT    Room Number: 365/365-A  Evaluation Date: 8/7/2019     Type of Evaluation: Quick Eval  Presenting Problem: s/p R knee arthrotomy and I&D 8/6/19    Physician Order: IP Consult to Occupational Therapy  Reas BEARING RESTRICTION  Weight Bearing Restriction: R lower extremity        R Lower Extremity: Weight Bearing as Tolerated       PAIN ASSESSMENT  Ratin  Location: R knee  Management Techniques: Activity promotion; Body mechanics;Breathing techniques;Relax home), simulated toileting via Mod I. Patient also educated on OT role, safety, fall prevention, pain management, shower transfers with good verbal understanding.      Patient End of Session: Up in chair;Needs met;Call light within reach;RN aware of session independently  Patient able to dress lower extremities: safely and independently  Patient/Caregiver able to demonstrate safety with ADLS: safely and independently

## 2019-08-07 NOTE — PROGRESS NOTES
[facility]                INFECTIOUS DISEASE PROGRESS NOTE    Adonis Bruce Galeana Patient Status:  Inpatient    3/16/1960 MRN [id number]   [facility] 3SW-A Attending Ryan Goldstein MD   Hosp Day # 2 PCP Dar Elias MD     Anti Blood Culture Result No Growth 1 Day N/A     Collected:  8/5/2019  9:09 PM Status:  Preliminary result   Specimen Information: Blood,peripheral        BLOOD CULTURE RESULT No Growth 1 Day          Resulting Agency: [facility]         Specimen Collected: traumatic laceration with axe  -cx showing Michael Victoria  Sp I/D 8/6, prelim cx GNR    Blood cx 8/5 no growth to date    Ortho following, drain in  Place, PT/OT  PICC line today  Possible dc on 8/8 on ceftriaxone 2g daily > 4 weeks      Bill Flor

## 2019-08-07 NOTE — PLAN OF CARE
Returned from surgery. ivf infusing. Knee immobilizer to right knee. Ace wrap dressing clean,dry, intact. alexx drain to suction with scant amount of drainage in bulb. Cap refill to rle < 3 seconds.    Instructed on plan of care, pain management, call

## 2019-08-07 NOTE — PLAN OF CARE
Pt A & O x3, /IS. On RA. SCD LLE. Lovenox. Regular diet. WBAT. PT/OT. Drain removed per order. Changed dressing to coverlet. PICC line later today. Daughter at bedside, updated on POC. Will continue to monitor. Up x sba.

## 2019-08-08 ENCOUNTER — PATIENT OUTREACH (OUTPATIENT)
Dept: CASE MANAGEMENT | Age: 59
End: 2019-08-08

## 2019-08-08 DIAGNOSIS — M00.861 ARTHRITIS OF RIGHT KNEE DUE TO OTHER BACTERIA (HCC): ICD-10-CM

## 2019-08-08 DIAGNOSIS — M00.9 PYOGENIC ARTHRITIS OF KNEE, DUE TO UNSPECIFIED ORGANISM, UNSPECIFIED LATERALITY (HCC): ICD-10-CM

## 2019-08-08 DIAGNOSIS — Z02.9 ENCOUNTERS FOR UNSPECIFIED ADMINISTRATIVE PURPOSE: ICD-10-CM

## 2019-08-08 LAB
CRYSTALS: NEGATIVE
RBC SYNOVIAL FLUID: ABNORMAL /MM3
WBC SYNOVIAL FLUID: ABNORMAL /MM3 (ref ?–150)

## 2019-08-08 PROCEDURE — 1111F DSCHRG MED/CURRENT MED MERGE: CPT

## 2019-08-08 NOTE — PAYOR COMM NOTE
--------------  DISCHARGE REVIEW    Secondary Payor: [address] #:  [id number]  Authorization Number:  [id number]       Admit date: 8/5/19  Admit time:  2312  Discharge Date: 8/7/2019  4:20 PM     Admitting Physician: Leigha Chau

## 2019-08-08 NOTE — PROGRESS NOTES
Initial Post Discharge Follow Up   Discharge Date: 8/7/19  Contact Date: 8/8/2019    Consent Verification:  Assessment Completed With: Patient  HIPAA Verified?   Yes    Discharge Dx:    Septic arthritis right knee, s/p R knee arthrotomy and I&D 8/6/19 Not addressed      Medications:     Current Outpatient Medications:  [START ON 8/9/2019] sodium chloride 0.9% SOLN 100 mL with cefTRIAXone Sodium 2 g SOLR 2 g Inject 2 g into the vein daily for 26 days.  Cbc cmp crp weekly Disp: 26 Bag Rfl: 0   HYDROcodone- D/C within 7-14 days no     NCM Reviewed/scheduled/rescheduled PCP TCM/HFU appointment with pt:  Yes, NCM attempted to schedule TCM HFU patient states he will call to schedule himself. Patient scheduled HFU on 8/9/19, NCM changed visit type to TCM HFU.

## 2019-08-08 NOTE — PAYOR COMM NOTE
--------------  DISCHARGE REVIEW    Payor: [address]  Subscriber #:  [id number]  Authorization Number: [id number]    Admit date: 8/5/19  Admit time:  2312  Discharge Date: 8/7/2019  4:20 PM     Admitting Physician: Moiz Simpson MD  Attendin

## 2019-08-09 ENCOUNTER — OFFICE VISIT (OUTPATIENT)
Dept: FAMILY MEDICINE CLINIC | Facility: CLINIC | Age: 59
End: 2019-08-09
Payer: COMMERCIAL

## 2019-08-09 ENCOUNTER — HOSPITAL ENCOUNTER (EMERGENCY)
Facility: HOSPITAL | Age: 59
Discharge: HOME | End: 2019-08-09
Attending: EMERGENCY MEDICINE
Payer: COMMERCIAL

## 2019-08-09 VITALS
TEMPERATURE: 98 F | RESPIRATION RATE: 15 BRPM | WEIGHT: 175 LBS | HEIGHT: 73 IN | BODY MASS INDEX: 23.19 KG/M2 | DIASTOLIC BLOOD PRESSURE: 66 MMHG | OXYGEN SATURATION: 99 % | HEART RATE: 77 BPM | SYSTOLIC BLOOD PRESSURE: 110 MMHG

## 2019-08-09 VITALS
OXYGEN SATURATION: 98 % | RESPIRATION RATE: 18 BRPM | DIASTOLIC BLOOD PRESSURE: 60 MMHG | HEART RATE: 77 BPM | HEIGHT: 74 IN | TEMPERATURE: 100 F | BODY MASS INDEX: 23.1 KG/M2 | SYSTOLIC BLOOD PRESSURE: 102 MMHG | WEIGHT: 180 LBS

## 2019-08-09 DIAGNOSIS — T50.905A DRUG-INDUCED URTICARIA: Primary | ICD-10-CM

## 2019-08-09 DIAGNOSIS — M25.461 SWELLING OF JOINT, KNEE, RIGHT: ICD-10-CM

## 2019-08-09 DIAGNOSIS — M00.80 ARTHRITIS DUE TO OTHER BACTERIA, SEPTIC ARTHRITIS OF UNSPECIFIED LOCATION (HCC): Primary | ICD-10-CM

## 2019-08-09 DIAGNOSIS — L50.0 DRUG-INDUCED URTICARIA: Primary | ICD-10-CM

## 2019-08-09 DIAGNOSIS — L50.0 DRUG-INDUCED URTICARIA: ICD-10-CM

## 2019-08-09 DIAGNOSIS — E78.00 PURE HYPERCHOLESTEROLEMIA: ICD-10-CM

## 2019-08-09 DIAGNOSIS — M25.561 ACUTE PAIN OF RIGHT KNEE: ICD-10-CM

## 2019-08-09 DIAGNOSIS — M00.861 ARTHRITIS OF RIGHT KNEE DUE TO OTHER BACTERIA (HCC): ICD-10-CM

## 2019-08-09 DIAGNOSIS — T50.905A DRUG-INDUCED URTICARIA: ICD-10-CM

## 2019-08-09 PROCEDURE — 99284 EMERGENCY DEPT VISIT MOD MDM: CPT

## 2019-08-09 PROCEDURE — 99285 EMERGENCY DEPT VISIT HI MDM: CPT

## 2019-08-09 PROCEDURE — 99496 TRANSJ CARE MGMT HIGH F2F 7D: CPT | Performed by: FAMILY MEDICINE

## 2019-08-09 PROCEDURE — 96365 THER/PROPH/DIAG IV INF INIT: CPT

## 2019-08-09 PROCEDURE — 1111F DSCHRG MED/CURRENT MED MERGE: CPT | Performed by: FAMILY MEDICINE

## 2019-08-09 PROCEDURE — S0073 INJECTION, AZTREONAM, 500 MG: HCPCS | Performed by: INTERNAL MEDICINE

## 2019-08-09 RX ORDER — DIPHENHYDRAMINE HCL 25 MG
25 CAPSULE ORAL ONCE
Status: COMPLETED | OUTPATIENT
Start: 2019-08-09 | End: 2019-08-09

## 2019-08-09 NOTE — ED INITIAL ASSESSMENT (HPI)
Patient presents with possible allergic reaction. He reports that he has an infection in his right knee that he is receiving antibiotics for. He was administering his second dose of antibiotics today when he noticed hives and itching.  Denies difficulty filemon

## 2019-08-09 NOTE — DISCHARGE SUMMARY
[facility] HOSPITALIST  DISCHARGE SUMMARY     Xavi Galeana Patient Status:  Inpatient    3/16/1960 MRN [id number]   [facility] 3SW-A Attending No att. providers found   [facility] Day # 2 PCP Fredo Stevenson MD     Date of Admission: 2019 by your doctor or nurse    Bring a paper prescription for each of these medications  · HYDROcodone-acetaminophen 5-325 MG Tabs         [place]    Follow-up appointment:   Germain Billingsley. [address] [phone]- guarding. Neurologic: No focal neurological deficits. Musculoskeletal: Moves all extremities. Extremities: No edema.   -----------------------------------------------------------------------------------------------  PATIENT DISCHARGE INSTRUCTIONS: See e

## 2019-08-09 NOTE — ED PROVIDER NOTES
Patient Seen in: [facility] Emergency Department    History   Patient presents with: Allergic Rxn Allergies (immune)    Stated Complaint: possible allergic reation, hives.  took benadryl     HPI    63-year-old male who was recently diagnosed with a src Oral   SpO2 100 %   O2 Device None (Room air)       Current:/67   Pulse 68   Temp 98.2 °F (36.8 °C) (Oral)   Resp 15   Ht 185.4 cm (6' 1\")   Wt 79.4 kg   SpO2 99%   BMI 23.09 kg/m²         Physical Exam    General:  Vitals as listed.   No acute d

## 2019-08-09 NOTE — CM/SW NOTE
Dr. Ivon Viveros office called back and report that patient's insurance isn't covered at their pharmacy so they can't order the IV ABX outpatient for the patient.  This writer is attempting to Fed[employer] to  and [employer] so patient can be discharged with

## 2019-08-09 NOTE — CM/SW NOTE
Info ECINed to [facility] for Azactam 2gm IV ABX BID for approval.  Info faxed to [facility] Hilda Barrientos for Hilda Barrientos RN, weekly dressing changes for PICC and labs.

## 2019-08-09 NOTE — CM/SW NOTE
Patient called and updated that he will need to come back tomorrow morning to be admitted for two times daily IV ABX administration until approval for home ABX and [address] [id number] RN services. Patient in agreement and verbalizes understanding.  Patient reports some m

## 2019-08-09 NOTE — PROGRESS NOTES
HPI:    Violeta White is a 61year old male here today for hospital follow up.    He was discharged from Inpatient hospital, [facility] to Home   Admission Date: 8/5/19   Discharge Date: 8/7/19  Hospital Discharge Diagnoses (since 7/10/2019) septi weakness.  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 Patient was admitted to the hospital he underwent IND of the right knee by orthopedic doctor. He was started on Rocephin 2 g 24 hours.   Recommended to continue IV antibiotic grandfather; Lipids in his father; Other in his brother and sister. He  reports that he has been smoking cigarettes. He has a 5.00 pack-year smoking history. He has never used smokeless tobacco. He reports that he does not drink alcohol or use drugs. or tenderness  MUSCULOSKELETAL: Patient has significant swelling of the right knee status post I&D dressing applied to the lateral aspect of the right knee, it is tender patient is using crutches to ambulate, no significant redness  EXTREMITIES: no cyanosi Ref Range    WBC Synovial 66,970 (H) <=150 /mm3    RBC Synovial 60,000 /mm3    Source Crystals RIGHT KNEE     Crystals Negative Negative   CELL CT/DIF & CRYSTAL SYNOVIAL   Result Value Ref Range    Neutrophil Synovial 91 (H) <=20 %    Lymph Synovial 6 % 11.0 - 15.0 %    RDW-SD 39.0 35.1 - 46.3 fL    Neutrophil Absolute Prelim 7.26 1.50 - 7.70 x10 (3) uL    Neutrophil Absolute 7.26 1.50 - 7.70 x10(3) uL    Lymphocyte Absolute 2.03 1.00 - 4.00 x10(3) uL    Monocyte Absolute 0.90 0.10 - 1.00 x10(3) uL    Eos and consulting him regarding further steps in patient management recommended to send patient to the emergency room to start new course of antibiotic today and arrange for home therapy through PICC line.     Drug-induced urticaria patient recommended to use pain.  Patient seen within 7 days from date of discharge.      Irvin Ramsey MD, 8/9/2019

## 2019-08-09 NOTE — PATIENT INSTRUCTIONS
Proceed to the ER for change in antibiotic. Continue Benadryl 25 mg 1 tablet every 6 hours till having hives.

## 2019-08-09 NOTE — CM/SW NOTE
Mary from [place] [address] reports no approval for patient yet. Also, patient's antibiotic delivery method changed from IV push to a ball. So, patient will need to be re-taught by a RN how to use the device.  No [facility] RN set up at this time as no insurance appro

## 2019-08-09 NOTE — PROGRESS NOTES
[facility]                INFECTIOUS DISEASE PROGRESS NOTE    Kathie Galeana Patient Status:  Inpatient    3/16/1960 MRN [id number]   [facility] 3SW-A Attending Jose Mukherjee MD   Hosp Day # 0 PCP Kelli Barton MD     Anti Specimen Collected: 08/05/19  9:09 PM Last Resulted: 08/06/19 10:01 PM           Collected:  8/2/2019  4:05 PM Status:  Final result   Specimen Information: Synovial fluid knee right;  Body fluid, unspecified         Body Fluid Culture Result:     Lab   Lec Problem list reviewed:  Patient Active Problem List:     Other abnormal blood chemistry     Disorders of bilirubin excretion     Pure hypercholesterolemia     Nonspecific abnormal electrocardiogram (ECG) (EKG)     Laboratory examination ordered as pa

## 2019-08-09 NOTE — HOME CARE LIAISON
Referral received from [facility] in ED. Residential will put through [address] referral to push insurance acceptance, awaiting insurance approval.  Patient will need RN for IVAB/weekly dressing changes/weekly labs.   Per Pawan Phipps will be the pharmacy prov

## 2019-08-09 NOTE — CONSULTS
Pharmacy Signing-off from Vancomycin Dosing Consult    Pharmacy is signing off of vancomycin dosing pursuant to orders for azactam today from Dr. Twila Austin.  If pharmacy should resume dosing any antibiotic for this patient, please order another consult stating

## 2019-08-10 ENCOUNTER — APPOINTMENT (OUTPATIENT)
Dept: ULTRASOUND IMAGING | Facility: HOSPITAL | Age: 59
DRG: 550 | End: 2019-08-10
Attending: EMERGENCY MEDICINE
Payer: COMMERCIAL

## 2019-08-10 ENCOUNTER — HOSPITAL ENCOUNTER (INPATIENT)
Facility: HOSPITAL | Age: 59
LOS: 2 days | Discharge: HOME HEALTH | DRG: 550 | End: 2019-08-12
Attending: EMERGENCY MEDICINE | Admitting: HOSPITALIST
Payer: COMMERCIAL

## 2019-08-10 DIAGNOSIS — M00.9 PYOGENIC ARTHRITIS OF RIGHT KNEE JOINT, DUE TO UNSPECIFIED ORGANISM (HCC): Primary | ICD-10-CM

## 2019-08-10 PROCEDURE — 93971 EXTREMITY STUDY: CPT | Performed by: EMERGENCY MEDICINE

## 2019-08-10 PROCEDURE — 99223 1ST HOSP IP/OBS HIGH 75: CPT | Performed by: HOSPITALIST

## 2019-08-10 RX ORDER — HYDROCODONE BITARTRATE AND ACETAMINOPHEN 5; 325 MG/1; MG/1
2 TABLET ORAL EVERY 4 HOURS PRN
Status: DISCONTINUED | OUTPATIENT
Start: 2019-08-10 | End: 2019-08-12

## 2019-08-10 RX ORDER — POLYETHYLENE GLYCOL 3350 17 G/17G
17 POWDER, FOR SOLUTION ORAL DAILY PRN
Status: DISCONTINUED | OUTPATIENT
Start: 2019-08-10 | End: 2019-08-12

## 2019-08-10 RX ORDER — ENOXAPARIN SODIUM 100 MG/ML
40 INJECTION SUBCUTANEOUS NIGHTLY
Status: DISCONTINUED | OUTPATIENT
Start: 2019-08-10 | End: 2019-08-12

## 2019-08-10 RX ORDER — ACETAMINOPHEN 500 MG
1000 TABLET ORAL ONCE
Status: COMPLETED | OUTPATIENT
Start: 2019-08-10 | End: 2019-08-10

## 2019-08-10 RX ORDER — ONDANSETRON 2 MG/ML
4 INJECTION INTRAMUSCULAR; INTRAVENOUS EVERY 6 HOURS PRN
Status: DISCONTINUED | OUTPATIENT
Start: 2019-08-10 | End: 2019-08-12

## 2019-08-10 RX ORDER — TEMAZEPAM 7.5 MG/1
7.5 CAPSULE ORAL NIGHTLY PRN
Status: DISCONTINUED | OUTPATIENT
Start: 2019-08-10 | End: 2019-08-12

## 2019-08-10 RX ORDER — HYDROCODONE BITARTRATE AND ACETAMINOPHEN 5; 325 MG/1; MG/1
1 TABLET ORAL EVERY 4 HOURS PRN
Status: DISCONTINUED | OUTPATIENT
Start: 2019-08-10 | End: 2019-08-12

## 2019-08-10 RX ORDER — ACETAMINOPHEN 325 MG/1
650 TABLET ORAL EVERY 4 HOURS PRN
Status: DISCONTINUED | OUTPATIENT
Start: 2019-08-10 | End: 2019-08-12

## 2019-08-10 RX ORDER — METOCLOPRAMIDE HYDROCHLORIDE 5 MG/ML
10 INJECTION INTRAMUSCULAR; INTRAVENOUS EVERY 8 HOURS PRN
Status: DISCONTINUED | OUTPATIENT
Start: 2019-08-10 | End: 2019-08-12

## 2019-08-10 NOTE — ED NOTES
Report given to samuel rn room 382, pt stating that he is feeling much better pain 3/10, moving easier feeling less swollen in joints

## 2019-08-10 NOTE — CM/SW NOTE
Followed up on IV abx orders through [facility] and contacted [facility]. They are willing to accept patient but both [facility] and [facility] are closed today.  They were able to contact [facility] yesterday but per [facility] they are the secondary insurance of the

## 2019-08-10 NOTE — ED PROVIDER NOTES
Patient Seen in: [facility] Emergency Department    History   Patient presents with:  Cellulitis (integumentary, infectious)    Stated Complaint: septic knee, in need of IV antibiotics    HPI    Patient presents for IV antibiotics.   The patient was Cigarettes      Smokeless tobacco: Never Used    Alcohol use: No      Frequency: Never      Binge frequency: Never    Drug use:  No             Review of Systems    Positive for stated complaint: septic knee, in need of IV antibiotics  Other systems are as Brachial, Basilic, Cephalic, and the contralateral Subclavian and Jugular. PATIENT STATED HISTORY: (As transcribed by Technologist)  The patient states his whole right arm hurts. FINDINGS:  EXTREMITY:  Right THROMBI:  None visible.  COMPRESSION:  Normal

## 2019-08-10 NOTE — ED NOTES
Pt here for iv antibiotic for knee wound infection, had reaction to rocephin, joint pain and blotchiness to skin, no rash noted, no s/s respiratory distress,

## 2019-08-10 NOTE — H&P
[facility]  History and Physical     Anila Cleary Patient Status:  Emergency    3/16/1960 MRN [id number]   Location [address] [facility] Attending Rudy Zapata MD   Hosp Day # 0 PCP Kelli Barton MD     Chief Com Current Outpatient Medications on File Prior to Encounter:  HYDROcodone-acetaminophen 5-325 MG Oral Tab Take 1-2 tablets by mouth every 6 (six) hours as needed.  Disp: 15 tablet Rfl: 0   sodium chloride 0.9% SOLN 100 mL with aztreonam 2 g SOLR 2 g Inject hours. No results for input(s): TROP, CK in the last 168 hours. Imaging: Imaging data reviewed in Epic. ASSESSMENT / PLAN:     1. Septic knee joint s/p I&D during last admission. 1. Cont IV aztreonam   2.  CTX allergy     Quality:  · DVT Prophy

## 2019-08-10 NOTE — CONSULTS
[facility] Pharmacy Note:  Renal Adjustment for aztreonam (AZACTAM)    Ailyn Dickinson is a 61year old male who has been prescribed aztreonam (AZACTAM) 2000 mg every 12 hrs. CrCl is estimated creatinine clearance is 117.5 mL/min (based on SCr of 0.76 mg/dL).  s

## 2019-08-11 PROCEDURE — 99232 SBSQ HOSP IP/OBS MODERATE 35: CPT | Performed by: HOSPITALIST

## 2019-08-11 RX ORDER — KETOROLAC TROMETHAMINE 30 MG/ML
30 INJECTION, SOLUTION INTRAMUSCULAR; INTRAVENOUS EVERY 6 HOURS PRN
Status: DISCONTINUED | OUTPATIENT
Start: 2019-08-11 | End: 2019-08-12

## 2019-08-11 NOTE — PLAN OF CARE
Right knee incision clean dry and intact. Coverlet changed. Mild to mod pain controlled with norco. Up with min assist using crutches. IV antibiotics as ordered. Plan of care reviewed. Safety precautions maintained. Call light within reach. Bed alarm on.

## 2019-08-11 NOTE — HOME CARE LIAISON
This patient is current with [facility] services, RN. Will need resume orders prior to discharge. Thanks!

## 2019-08-11 NOTE — PLAN OF CARE
Right knee sutures clean, dry, intact, approximated. Dressing changed, sterile coverlet placed. States generalized body aches still remain. States relief from toradol given.    Instructed on plan of care, pain management, and to call for all asst needed,

## 2019-08-11 NOTE — PROGRESS NOTES
[facility]  Progress Note     Jt Lowery Patient Status:  Inpatient    3/16/1960 MRN [id number]   [facility] 3SW-A Attending Flaca Diaz MD   Hosp Day # 1 PCP Oc Navarrete MD     Chief Complaint: Pain     S: Patient data reviewed in Epic. Medications:   • aztreonam (AZACTAM) 2g/100ml  2 g Intravenous Q8H   • enoxaparin  40 mg Subcutaneous Nightly       ASSESSMENT / PLAN:     1. Septic knee joint s/p I&D during last admission. 1. Cont IV aztreonam   2.  Atypical CT

## 2019-08-12 ENCOUNTER — TELEPHONE (OUTPATIENT)
Dept: FAMILY MEDICINE CLINIC | Facility: CLINIC | Age: 59
End: 2019-08-12

## 2019-08-12 VITALS
HEIGHT: 73 IN | WEIGHT: 175 LBS | HEART RATE: 69 BPM | SYSTOLIC BLOOD PRESSURE: 110 MMHG | TEMPERATURE: 100 F | BODY MASS INDEX: 23.19 KG/M2 | OXYGEN SATURATION: 91 % | RESPIRATION RATE: 17 BRPM | DIASTOLIC BLOOD PRESSURE: 61 MMHG

## 2019-08-12 LAB
BASOPHILS # BLD AUTO: 0.05 X10(3) UL (ref 0–0.2)
BASOPHILS NFR BLD AUTO: 0.5 %
CRP SERPL-MCNC: 6.16 MG/DL (ref ?–0.3)
DEPRECATED RDW RBC AUTO: 38.5 FL (ref 35.1–46.3)
EOSINOPHIL # BLD AUTO: 0.33 X10(3) UL (ref 0–0.7)
EOSINOPHIL NFR BLD AUTO: 3.5 %
ERYTHROCYTE [DISTWIDTH] IN BLOOD BY AUTOMATED COUNT: 11.9 % (ref 11–15)
HCT VFR BLD AUTO: 35.8 % (ref 39–53)
HGB BLD-MCNC: 12.1 G/DL (ref 13–17.5)
IMM GRANULOCYTES # BLD AUTO: 0.05 X10(3) UL (ref 0–1)
IMM GRANULOCYTES NFR BLD: 0.5 %
LYMPHOCYTES # BLD AUTO: 2.07 X10(3) UL (ref 1–4)
LYMPHOCYTES NFR BLD AUTO: 22 %
MCH RBC QN AUTO: 29.7 PG (ref 26–34)
MCHC RBC AUTO-ENTMCNC: 33.8 G/DL (ref 31–37)
MCV RBC AUTO: 88 FL (ref 80–100)
MONOCYTES # BLD AUTO: 0.74 X10(3) UL (ref 0.1–1)
MONOCYTES NFR BLD AUTO: 7.9 %
NEUTROPHILS # BLD AUTO: 6.15 X10 (3) UL (ref 1.5–7.7)
NEUTROPHILS # BLD AUTO: 6.15 X10(3) UL (ref 1.5–7.7)
NEUTROPHILS NFR BLD AUTO: 65.6 %
PLATELET # BLD AUTO: 403 10(3)UL (ref 150–450)
RBC # BLD AUTO: 4.07 X10(6)UL (ref 4.3–5.7)
WBC # BLD AUTO: 9.4 X10(3) UL (ref 4–11)

## 2019-08-12 PROCEDURE — 99239 HOSP IP/OBS DSCHRG MGMT >30: CPT | Performed by: HOSPITALIST

## 2019-08-12 NOTE — CM/SW NOTE
Noted patient readmitted after recent discharge from hospital with IV antibiotics arranged through [facility] for in office teach and train. Chart reviewed, including ED CM notes stating referrals made to [address] [place] [address] and [facility].   Pt returned to ED after (self-referral)   Reason for Referral Discharge planning;Psychoscial assessment   Informant Patient; Other   Patient Info   Patient's Mental Status Alert;Oriented   Patient's [address]   Number of Levels in Home 1   Patient lives with Spouse

## 2019-08-12 NOTE — PROGRESS NOTES
[facility]                INFECTIOUS DISEASE PROGRESS NOTE    Sean Mercerjaren Patient Status:  Inpatient    3/16/1960 MRN [id number]   [facility] 3SW-A Attending Ward Liang MD   Hosp Day # 2 PCP Jose A Hernandez MD     Anti Specimen Information: Synovial fluid knee right;  Body fluid, unspecified         Body Fluid Culture Result:     Lab   Leclercia adecarboxylataAbnormal   [facility]              Body Fluid Smear  Abnormal      Lab   No organisms seen [facility]       4+ N Pure hypercholesterolemia     Nonspecific abnormal electrocardiogram (ECG) (EKG)     Laboratory examination ordered as part of a routine general medical examination     Tear of medial cartilage or meniscus of knee, current     Screening for other and un

## 2019-08-12 NOTE — CM/SW NOTE
Spoke with Monica Roca from [place] who confirmed arrangements in place for home infusion for discharge today. They have arranged nursing and will see pt in AM.  She met with pt and reviewed all insurance information and coverage for patient.   Pt in agreeme

## 2019-08-12 NOTE — PAYOR COMM NOTE
--------------  ADMISSION REVIEW     Payor: [address] [facility]  Subscriber #:  [id number]  Authorization Number: [id number]    ED Provider Notes        Patient Seen in: [facility] Emergency Department    History   Patient presents with:  [employer]    Positive for stated complaint: septic knee, in need of IV antibiotics  Other systems are as noted in HPI. Constitutional and vital signs reviewed. All other systems reviewed and negative except as noted above.     Physical Exam     ED [facility] whole right arm hurts. FINDINGS:  EXTREMITY:  Right THROMBI:  None visible. COMPRESSION:  Normal compressibility, phasicity, and augmentation of the subclavian, jugular, axillary, brachial, basilic, and cephalic veins.  OTHER:  A right-sided PICC line is lesions. Psychiatric: Appropriate mood and affect.       Diagnostic Data:      Labs:  Recent Labs   Lab 08/05/19 2109 08/06/19 0515   WBC 10.6 10.6   HGB 14.0 13.0   MCV 88.3 88.9   .0 342.0       Recent Labs   Lab 08/05/19 2109 08/06/19 0515

## 2019-08-12 NOTE — TELEPHONE ENCOUNTER
Call from earline/spouse-[facility] pt remains inpt at [facility], receiving IV abx twice a day. sts being advised anticipated duration for IV abx is approx 4 wks-but will depend on insurance approval and pt's progress.  [facility] has tried getting FMLA papers completed by

## 2019-08-12 NOTE — PROGRESS NOTES
[facility]  Progress Note     Rissa Dang Patient Status:  Inpatient    3/16/1960 MRN [id number]   [facility] 3SW-A Attending Yonas Carbajal MD   Hosp Day # 2 PCP Hardin Cooks, MD     Chief Complaint: Pain     S: Patient Intravenous Q8H   • enoxaparin  40 mg Subcutaneous Nightly       ASSESSMENT / PLAN:     1. Septic knee joint s/p I&D during last admission. 1. Cont IV aztreonam   2. Atypical CTX allergy     Plan of care:   Patient medically stable.  Attempting to figure

## 2019-08-13 ENCOUNTER — TELEPHONE (OUTPATIENT)
Dept: FAMILY MEDICINE CLINIC | Facility: CLINIC | Age: 59
End: 2019-08-13

## 2019-08-13 ENCOUNTER — PATIENT OUTREACH (OUTPATIENT)
Dept: CASE MANAGEMENT | Age: 59
End: 2019-08-13

## 2019-08-13 NOTE — DISCHARGE SUMMARY
[facility] HOSPITALIST  DISCHARGE SUMMARY     Kaleigh Gloriaharrison Sylviejaren Patient Status:  Inpatient    3/16/1960 MRN [id number]   [facility] 3SW-A Attending No att. providers found   [facility] Day # 2 PCP Elzbieta Suh MD     Date of Admission: 8/10/2019 these medications      Instructions Prescription details   HYDROcodone-acetaminophen 5-325 MG Tabs  Commonly known as:  NORCO      Take 1-2 tablets by mouth every 6 (six) hours as needed.    Quantity:  15 tablet  Refills:  0           Where to Get Your Medi

## 2019-08-13 NOTE — TELEPHONE ENCOUNTER
I totally understand patient's situation. Unfortunately I do not manage patient like for of antibiotic treatment. I also do not manage orthopedic conditions.   If you would like me to fill paperwork for him I would like to have some notes from the special

## 2019-08-13 NOTE — PROGRESS NOTES
NURSING DISCHARGE NOTE    Discharged via wheelchair  Accompanied by son  Belongings all sent with the patient

## 2019-08-13 NOTE — CM/SW NOTE
08/13/19 0800   Discharge disposition   Expected discharge disposition Home-Health   Name of Facillity/Home Care/Hospice Residential   HME provider   ([facility] for IV abx and supplies)   Discharge transportation Private car   [place] 8/12/19

## 2019-08-13 NOTE — PLAN OF CARE
Page with return call to Dr. Jennifer Horton at approx [phone], informed patient cleared for discharge and needs [facility] rec on AVS, informed MD that patient awaiting discharge. PICC flushed and capped for discharge.      Patient and son aware of home PICC care and home

## 2019-08-13 NOTE — TELEPHONE ENCOUNTER
Nurse Lamont 45   [facility]  Pt needing IV abx d/t L knee infection    Nurse reported per pt \"he will be trained\" by the company that will bring in the abx supplies  Don't want to pay addl co pay     Advised this [facility] to contact

## 2019-08-13 NOTE — PAYOR COMM NOTE
--------------  ADMISSION REVIEW     Payor: [address]  Subscriber #:  [id number]  Authorization Number: [id number]    Admit date: 8/10/19  Admit time: 200   DISCHARGED 8/12        Admitting Physician: Masoud Hernandez MD  Attending Physician: Procedure Laterality Date   • COLONOSCOPY N/A 11/27/2013    Performed by Manuel Horan MD at [address] [place] [address] Way Right 8/19/2013    Performed by Jens Raya MD at [facility] MAIN OR   • HAND/FINGER SURGERY UNLISTED      27 y ago ri Extremities: Right knee with dressing intact, feels very warm to touch but no overlying erythema, pedal pulses intact. Mild generalized edema to both hands without associated erythema. Skin: Warm and dry. No urticaria. Neurologic: No focal deficits. The patient will be admitted to Dr. Clementine Pelaez from the hospitalist service to receive his IV antibiotics until further outpatient arrangements can be made. The patient's hand swelling I think is related to the allergic reaction but is mild.   He has no rash o History of Present Illness: Analisa Eubanks is a 61year old male with recently diagnosed septic arthritis after he cut his knee chopping wood.  Patient had reaction shortly after his second outpatient dose of CTX (chills/shakes then rash on his thighs and sodium chloride 0.9% SOLN 100 mL with aztreonam 2 g SOLR 2 g Inject 2 g into the vein every 12 (twelve) hours for 26 days. Disp: 14 Bag Rfl: 3       Review of Systems:   A comprehensive 14 point review of systems was completed.     Pertinent positives and n 1. Septic knee joint s/p I&D during last admission. 1. Cont IV aztreonam   2.  CTX allergy     Quality:  · DVT Prophylaxis: ambulate/lovenox   · CODE status: full  · Barron: none    Plan of care discussed with patient, ER     Janiya Oscar MD  8/10/2019 Recent Labs   Lab 08/05/19 2109 08/06/19  0515   GLU 97 105*   BUN 19* 16   CREATSERUM 0.84 0.76   GFRAA 111 115   GFRNAA 96 100   CA 9.0 8.5   ALB 2.8*  --     138   K 5.0 4.0    106   CO2 29.0 27.0   ALKPHO 81  --    AST 33  --    ALT 21  -- Temp:  [98.2 °F (36.8 °C)-98.8 °F (37.1 °C)] 98.8 °F (37.1 °C)  Pulse:  [59-77] 59  Resp:  [18] 18  BP: (104-119)/(49-68) 119/66     Wt Readings from Last 6 Encounters:  08/10/19 : 175 lb (79.4 kg)  08/09/19 : 175 lb (79.4 kg)  08/09/19 : 180 lb (81.6 kg) Date Action Dose Route User    Discharged on 8/12/2019 8/12/2019 1838 New Bag 2 g Intravenous Esthela Lazcano RN    8/12/2019 1226 New Bag 2 g Intravenous Amandeep Unger RN          REVIEWER COMMENTS:     OTHER:

## 2019-08-20 ENCOUNTER — LAB REQUISITION (OUTPATIENT)
Dept: LAB | Facility: HOSPITAL | Age: 59
End: 2019-08-20
Payer: COMMERCIAL

## 2019-08-20 DIAGNOSIS — M00.869: ICD-10-CM

## 2019-08-20 LAB
ANION GAP SERPL CALC-SCNC: 7 MMOL/L (ref 0–18)
BASOPHILS # BLD AUTO: 0.11 X10(3) UL (ref 0–0.2)
BASOPHILS NFR BLD AUTO: 1 %
BUN BLD-MCNC: 14 MG/DL (ref 7–18)
BUN/CREAT SERPL: 24.1 (ref 10–20)
CALCIUM BLD-MCNC: 8 MG/DL (ref 8.5–10.1)
CHLORIDE SERPL-SCNC: 103 MMOL/L (ref 98–112)
CO2 SERPL-SCNC: 29 MMOL/L (ref 21–32)
CREAT BLD-MCNC: 0.58 MG/DL (ref 0.7–1.3)
CRP SERPL-MCNC: 2.79 MG/DL (ref ?–0.3)
DEPRECATED RDW RBC AUTO: 38.7 FL (ref 35.1–46.3)
EOSINOPHIL # BLD AUTO: 0.25 X10(3) UL (ref 0–0.7)
EOSINOPHIL NFR BLD AUTO: 2.3 %
ERYTHROCYTE [DISTWIDTH] IN BLOOD BY AUTOMATED COUNT: 12 % (ref 11–15)
GLUCOSE BLD-MCNC: 103 MG/DL (ref 70–99)
HCT VFR BLD AUTO: 38.2 % (ref 39–53)
HGB BLD-MCNC: 12.5 G/DL (ref 13–17.5)
IMM GRANULOCYTES # BLD AUTO: 0.06 X10(3) UL (ref 0–1)
IMM GRANULOCYTES NFR BLD: 0.5 %
LYMPHOCYTES # BLD AUTO: 2.8 X10(3) UL (ref 1–4)
LYMPHOCYTES NFR BLD AUTO: 25.6 %
MCH RBC QN AUTO: 29.2 PG (ref 26–34)
MCHC RBC AUTO-ENTMCNC: 32.7 G/DL (ref 31–37)
MCV RBC AUTO: 89.3 FL (ref 80–100)
MONOCYTES # BLD AUTO: 0.67 X10(3) UL (ref 0.1–1)
MONOCYTES NFR BLD AUTO: 6.1 %
NEUTROPHILS # BLD AUTO: 7.03 X10 (3) UL (ref 1.5–7.7)
NEUTROPHILS # BLD AUTO: 7.03 X10(3) UL (ref 1.5–7.7)
NEUTROPHILS NFR BLD AUTO: 64.5 %
OSMOLALITY SERPL CALC.SUM OF ELEC: 289 MOSM/KG (ref 275–295)
PLATELET # BLD AUTO: 432 10(3)UL (ref 150–450)
POTASSIUM SERPL-SCNC: 3.9 MMOL/L (ref 3.5–5.1)
RBC # BLD AUTO: 4.28 X10(6)UL (ref 4.3–5.7)
SODIUM SERPL-SCNC: 139 MMOL/L (ref 136–145)
WBC # BLD AUTO: 10.9 X10(3) UL (ref 4–11)

## 2019-08-20 PROCEDURE — 85025 COMPLETE CBC W/AUTO DIFF WBC: CPT | Performed by: INTERNAL MEDICINE

## 2019-08-20 PROCEDURE — 80048 BASIC METABOLIC PNL TOTAL CA: CPT | Performed by: INTERNAL MEDICINE

## 2019-08-20 PROCEDURE — 86140 C-REACTIVE PROTEIN: CPT | Performed by: INTERNAL MEDICINE

## 2019-08-21 ENCOUNTER — ANESTHESIA EVENT (OUTPATIENT)
Dept: SURGERY | Facility: HOSPITAL | Age: 59
DRG: 502 | End: 2019-08-21
Payer: COMMERCIAL

## 2019-08-21 ENCOUNTER — ANESTHESIA (OUTPATIENT)
Dept: SURGERY | Facility: HOSPITAL | Age: 59
DRG: 502 | End: 2019-08-21
Payer: COMMERCIAL

## 2019-08-21 ENCOUNTER — HOSPITAL ENCOUNTER (INPATIENT)
Facility: HOSPITAL | Age: 59
LOS: 3 days | Discharge: HOME HEALTH | DRG: 502 | End: 2019-08-24
Attending: ORTHOPAEDIC SURGERY | Admitting: ORTHOPAEDIC SURGERY
Payer: COMMERCIAL

## 2019-08-21 DIAGNOSIS — M00.9: ICD-10-CM

## 2019-08-21 PROCEDURE — 0JDN0ZZ EXTRACTION OF RIGHT LOWER LEG SUBCUTANEOUS TISSUE AND FASCIA, OPEN APPROACH: ICD-10-PCS | Performed by: ORTHOPAEDIC SURGERY

## 2019-08-21 PROCEDURE — 99233 SBSQ HOSP IP/OBS HIGH 50: CPT | Performed by: HOSPITALIST

## 2019-08-21 RX ORDER — HYDROMORPHONE HYDROCHLORIDE 1 MG/ML
0.4 INJECTION, SOLUTION INTRAMUSCULAR; INTRAVENOUS; SUBCUTANEOUS EVERY 5 MIN PRN
Status: DISCONTINUED | OUTPATIENT
Start: 2019-08-21 | End: 2019-08-21 | Stop reason: HOSPADM

## 2019-08-21 RX ORDER — HYDROCODONE BITARTRATE AND ACETAMINOPHEN 5; 325 MG/1; MG/1
2 TABLET ORAL EVERY 4 HOURS PRN
Status: DISCONTINUED | OUTPATIENT
Start: 2019-08-21 | End: 2019-08-22

## 2019-08-21 RX ORDER — METOCLOPRAMIDE HYDROCHLORIDE 5 MG/ML
10 INJECTION INTRAMUSCULAR; INTRAVENOUS EVERY 8 HOURS PRN
Status: DISCONTINUED | OUTPATIENT
Start: 2019-08-21 | End: 2019-08-24

## 2019-08-21 RX ORDER — NALOXONE HYDROCHLORIDE 0.4 MG/ML
80 INJECTION, SOLUTION INTRAMUSCULAR; INTRAVENOUS; SUBCUTANEOUS AS NEEDED
Status: DISCONTINUED | OUTPATIENT
Start: 2019-08-21 | End: 2019-08-21 | Stop reason: HOSPADM

## 2019-08-21 RX ORDER — ACETAMINOPHEN 325 MG/1
650 TABLET ORAL EVERY 6 HOURS PRN
Status: DISCONTINUED | OUTPATIENT
Start: 2019-08-21 | End: 2019-08-24

## 2019-08-21 RX ORDER — DIPHENHYDRAMINE HYDROCHLORIDE 50 MG/ML
12.5 INJECTION INTRAMUSCULAR; INTRAVENOUS AS NEEDED
Status: DISCONTINUED | OUTPATIENT
Start: 2019-08-21 | End: 2019-08-21 | Stop reason: HOSPADM

## 2019-08-21 RX ORDER — ACETAMINOPHEN 500 MG
1000 TABLET ORAL ONCE AS NEEDED
Status: DISCONTINUED | OUTPATIENT
Start: 2019-08-21 | End: 2019-08-21 | Stop reason: HOSPADM

## 2019-08-21 RX ORDER — LABETALOL HYDROCHLORIDE 5 MG/ML
5 INJECTION, SOLUTION INTRAVENOUS EVERY 5 MIN PRN
Status: DISCONTINUED | OUTPATIENT
Start: 2019-08-21 | End: 2019-08-21 | Stop reason: HOSPADM

## 2019-08-21 RX ORDER — MEPERIDINE HYDROCHLORIDE 25 MG/ML
12.5 INJECTION INTRAMUSCULAR; INTRAVENOUS; SUBCUTANEOUS AS NEEDED
Status: DISCONTINUED | OUTPATIENT
Start: 2019-08-21 | End: 2019-08-21 | Stop reason: HOSPADM

## 2019-08-21 RX ORDER — HYDROCODONE BITARTRATE AND ACETAMINOPHEN 5; 325 MG/1; MG/1
1 TABLET ORAL EVERY 4 HOURS PRN
Status: DISCONTINUED | OUTPATIENT
Start: 2019-08-21 | End: 2019-08-22

## 2019-08-21 RX ORDER — MIDAZOLAM HYDROCHLORIDE 1 MG/ML
1 INJECTION INTRAMUSCULAR; INTRAVENOUS EVERY 5 MIN PRN
Status: DISCONTINUED | OUTPATIENT
Start: 2019-08-21 | End: 2019-08-21 | Stop reason: HOSPADM

## 2019-08-21 RX ORDER — BUPIVACAINE HYDROCHLORIDE AND EPINEPHRINE 5; 5 MG/ML; UG/ML
INJECTION, SOLUTION EPIDURAL; INTRACAUDAL; PERINEURAL AS NEEDED
Status: DISCONTINUED | OUTPATIENT
Start: 2019-08-21 | End: 2019-08-21 | Stop reason: HOSPADM

## 2019-08-21 RX ORDER — HYDROMORPHONE HYDROCHLORIDE 1 MG/ML
INJECTION, SOLUTION INTRAMUSCULAR; INTRAVENOUS; SUBCUTANEOUS
Status: COMPLETED
Start: 2019-08-21 | End: 2019-08-21

## 2019-08-21 RX ORDER — ONDANSETRON 2 MG/ML
4 INJECTION INTRAMUSCULAR; INTRAVENOUS AS NEEDED
Status: DISCONTINUED | OUTPATIENT
Start: 2019-08-21 | End: 2019-08-21 | Stop reason: HOSPADM

## 2019-08-21 RX ORDER — HYDROCODONE BITARTRATE AND ACETAMINOPHEN 5; 325 MG/1; MG/1
2 TABLET ORAL AS NEEDED
Status: DISCONTINUED | OUTPATIENT
Start: 2019-08-21 | End: 2019-08-21 | Stop reason: HOSPADM

## 2019-08-21 RX ORDER — BACITRACIN 50000 [USP'U]/1
INJECTION, POWDER, LYOPHILIZED, FOR SOLUTION INTRAMUSCULAR AS NEEDED
Status: DISCONTINUED | OUTPATIENT
Start: 2019-08-21 | End: 2019-08-21 | Stop reason: HOSPADM

## 2019-08-21 RX ORDER — ONDANSETRON 2 MG/ML
4 INJECTION INTRAMUSCULAR; INTRAVENOUS EVERY 6 HOURS PRN
Status: DISCONTINUED | OUTPATIENT
Start: 2019-08-21 | End: 2019-08-24

## 2019-08-21 RX ORDER — HYDROCODONE BITARTRATE AND ACETAMINOPHEN 5; 325 MG/1; MG/1
1 TABLET ORAL AS NEEDED
Status: DISCONTINUED | OUTPATIENT
Start: 2019-08-21 | End: 2019-08-21 | Stop reason: HOSPADM

## 2019-08-21 RX ORDER — SODIUM CHLORIDE, SODIUM LACTATE, POTASSIUM CHLORIDE, CALCIUM CHLORIDE 600; 310; 30; 20 MG/100ML; MG/100ML; MG/100ML; MG/100ML
INJECTION, SOLUTION INTRAVENOUS CONTINUOUS
Status: DISCONTINUED | OUTPATIENT
Start: 2019-08-21 | End: 2019-08-21 | Stop reason: HOSPADM

## 2019-08-21 RX ORDER — METOCLOPRAMIDE HYDROCHLORIDE 5 MG/ML
10 INJECTION INTRAMUSCULAR; INTRAVENOUS AS NEEDED
Status: DISCONTINUED | OUTPATIENT
Start: 2019-08-21 | End: 2019-08-21 | Stop reason: HOSPADM

## 2019-08-21 NOTE — PLAN OF CARE
Pt a direct admit. Saw Dr. Cleveland Fontana in clinic today and is here for I & D of R knee. Old incision noted with scab area over scar. Knee swollen, warm to touch. Pt denies numbness/tingling. Pt has PICC line and is taking aztreonam Q12H.   Pt has his own crut

## 2019-08-21 NOTE — CONSULTS
HAYLEY HOSPITALIST  [address] [facility] Patient Status:  Inpatient    3/16/1960 MRN [id number]   Location [facility]5 [facility] Attending Claudia Veras MD   Hosp Day # 0 PCP Dar Elias MD     Reason for consult: med manageme Family History   Problem Relation Age of Onset   • Lipids Father    • Heart Disorder Maternal Grandfather         MI 76   • Other (Other) Sister         obese, cocaine overdose   • Other (Other) Brother         colon polyp        Allergies:   Ceftriaxone Lab 08/20/19  1530   *   BUN 14   CREATSERUM 0.58*   GFRAA 129   GFRNAA 112   CA 8.0*      K 3.9      CO2 29.0       No results for input(s): PTP, INR in the last 168 hours. No results for input(s): TROP, CK in the last 168 hours.

## 2019-08-21 NOTE — ANESTHESIA PREPROCEDURE EVALUATION
PRE-OP EVALUATION    Patient Name: Sivakumar Ricks    Pre-op Diagnosis: Pyogenic arthritis, lower leg ([address] [M00.9]    Procedure(s):  OPEN IRRIGATION AND DEBRIDEMENT RIGHT KNEE    Surgeon(s) and Role:     * Mary Kay Gandhi, MD - Primary    Pre-op vitals r tobacco: Never Used    Alcohol use: No      Frequency: Never      Binge frequency: Never      Drug use: No     Available pre-op labs reviewed.   Lab Results   Component Value Date    WBC 10.9 08/20/2019    RBC 4.28 (L) 08/20/2019    HGB 12.5 (L) 08/20/2019

## 2019-08-21 NOTE — H&P
CHIEF COMPLAINT: Knee Pain (right knee - 2 weeks 1 day s/p sx with Dr. Tyler Mckeon)        DIAGNOSIS: Pyogenic arthritis of right knee joint, due to unspecified organism [facility])  (primary encounter diagnosis)        HPI:   Alyse Rubin is a 61year old male who arthritis he has been washed out on 8/6/2019. He has current evidence for continued septic knee        Plan :       I had a long discussion with Mr. Shoaib Dasilva today. It appears as though he has a continued septic knee.   I think his knee has to be washed ou

## 2019-08-21 NOTE — PLAN OF CARE
Down to OR via bed w/ transport, NPO , IVF infusing, consent signed, daughter at bedside, seen by Dr. Nguyễn Caraballo.

## 2019-08-21 NOTE — ANESTHESIA POSTPROCEDURE EVALUATION
[address] [facility] Patient Status:  Inpatient   Age/Gender 61year old male MRN [id number]   [facility] SURGERY Attending Delio Suárez, [facility] Se Day # 0 PCP Jose A Hernandez MD       Anesthesia Post-op Note    Procedure(

## 2019-08-22 PROBLEM — M00.9 PYOGENIC ARTHRITIS OF RIGHT KNEE JOINT (HCC): Status: ACTIVE | Noted: 2019-08-10

## 2019-08-22 LAB
ALBUMIN SERPL-MCNC: 2.5 G/DL (ref 3.4–5)
ALBUMIN/GLOB SERPL: 0.5 {RATIO} (ref 1–2)
ALP LIVER SERPL-CCNC: 104 U/L (ref 45–117)
ALT SERPL-CCNC: 27 U/L (ref 16–61)
ANION GAP SERPL CALC-SCNC: 6 MMOL/L (ref 0–18)
AST SERPL-CCNC: 17 U/L (ref 15–37)
BASOPHILS # BLD AUTO: 0.09 X10(3) UL (ref 0–0.2)
BASOPHILS NFR BLD AUTO: 0.8 %
BILIRUB SERPL-MCNC: 0.5 MG/DL (ref 0.1–2)
BUN BLD-MCNC: 13 MG/DL (ref 7–18)
BUN/CREAT SERPL: 24.1 (ref 10–20)
CALCIUM BLD-MCNC: 8.7 MG/DL (ref 8.5–10.1)
CHLORIDE SERPL-SCNC: 102 MMOL/L (ref 98–112)
CO2 SERPL-SCNC: 30 MMOL/L (ref 21–32)
CREAT BLD-MCNC: 0.54 MG/DL (ref 0.7–1.3)
CRP SERPL-MCNC: 5.21 MG/DL (ref ?–0.3)
DEPRECATED RDW RBC AUTO: 38.3 FL (ref 35.1–46.3)
EOSINOPHIL # BLD AUTO: 0.2 X10(3) UL (ref 0–0.7)
EOSINOPHIL NFR BLD AUTO: 1.8 %
ERYTHROCYTE [DISTWIDTH] IN BLOOD BY AUTOMATED COUNT: 11.8 % (ref 11–15)
GLOBULIN PLAS-MCNC: 4.8 G/DL (ref 2.8–4.4)
GLUCOSE BLD-MCNC: 89 MG/DL (ref 70–99)
HAV IGM SER QL: 2.1 MG/DL (ref 1.6–2.6)
HCT VFR BLD AUTO: 35.4 % (ref 39–53)
HGB BLD-MCNC: 11.8 G/DL (ref 13–17.5)
IMM GRANULOCYTES # BLD AUTO: 0.08 X10(3) UL (ref 0–1)
IMM GRANULOCYTES NFR BLD: 0.7 %
LYMPHOCYTES # BLD AUTO: 1.93 X10(3) UL (ref 1–4)
LYMPHOCYTES NFR BLD AUTO: 16.9 %
M PROTEIN MFR SERPL ELPH: 7.3 G/DL (ref 6.4–8.2)
MCH RBC QN AUTO: 29.5 PG (ref 26–34)
MCHC RBC AUTO-ENTMCNC: 33.3 G/DL (ref 31–37)
MCV RBC AUTO: 88.5 FL (ref 80–100)
MONOCYTES # BLD AUTO: 0.97 X10(3) UL (ref 0.1–1)
MONOCYTES NFR BLD AUTO: 8.5 %
NEUTROPHILS # BLD AUTO: 8.13 X10 (3) UL (ref 1.5–7.7)
NEUTROPHILS # BLD AUTO: 8.13 X10(3) UL (ref 1.5–7.7)
NEUTROPHILS NFR BLD AUTO: 71.3 %
OSMOLALITY SERPL CALC.SUM OF ELEC: 286 MOSM/KG (ref 275–295)
PLATELET # BLD AUTO: 395 10(3)UL (ref 150–450)
POTASSIUM SERPL-SCNC: 3.9 MMOL/L (ref 3.5–5.1)
RBC # BLD AUTO: 4 X10(6)UL (ref 4.3–5.7)
SODIUM SERPL-SCNC: 138 MMOL/L (ref 136–145)
WBC # BLD AUTO: 11.4 X10(3) UL (ref 4–11)

## 2019-08-22 PROCEDURE — 99232 SBSQ HOSP IP/OBS MODERATE 35: CPT | Performed by: HOSPITALIST

## 2019-08-22 RX ORDER — OXYCODONE HYDROCHLORIDE AND ACETAMINOPHEN 5; 325 MG/1; MG/1
2 TABLET ORAL EVERY 4 HOURS PRN
Status: DISCONTINUED | OUTPATIENT
Start: 2019-08-22 | End: 2019-08-24

## 2019-08-22 RX ORDER — OXYCODONE HYDROCHLORIDE AND ACETAMINOPHEN 5; 325 MG/1; MG/1
1 TABLET ORAL EVERY 4 HOURS PRN
Status: DISCONTINUED | OUTPATIENT
Start: 2019-08-22 | End: 2019-08-24

## 2019-08-22 RX ORDER — HYDROMORPHONE HYDROCHLORIDE 1 MG/ML
0.5 INJECTION, SOLUTION INTRAMUSCULAR; INTRAVENOUS; SUBCUTANEOUS
Status: DISCONTINUED | OUTPATIENT
Start: 2019-08-22 | End: 2019-08-24

## 2019-08-22 RX ORDER — HYDROMORPHONE HYDROCHLORIDE 1 MG/ML
1 INJECTION, SOLUTION INTRAMUSCULAR; INTRAVENOUS; SUBCUTANEOUS
Status: DISCONTINUED | OUTPATIENT
Start: 2019-08-22 | End: 2019-08-24

## 2019-08-22 RX ORDER — MAGNESIUM OXIDE 400 MG (241.3 MG MAGNESIUM) TABLET
3 TABLET NIGHTLY PRN
Status: DISCONTINUED | OUTPATIENT
Start: 2019-08-22 | End: 2019-08-24

## 2019-08-22 NOTE — PLAN OF CARE
Patients pain is well controlled with norco.  Right knee with surgical dressing intact. Drain in place. IV antibiotics as ordered. PICC to right upper arm. Plan of care reviewed. Safety precautions maintained. Call light within reach. Bed alarm on.

## 2019-08-22 NOTE — CONSULTS
[address] Patient Status:  Inpatient    3/16/1960 MRN [id number]   [facility] 3SW-A Attending Ranjit Pascal MD   [address] [facility] [address] Day # 1 PCP Bobby Chamorro Problem Relation Age of Onset   • Lipids Father    • Heart Disorder Maternal Grandfather         MI 76   • Other (Other) Sister         obese, cocaine overdose   • Other (Other) Brother         colon polyp      reports that he has been smoking cigarettes rhonchi. PICC line intact  Cardiovascular: S1, S2.  Regular rate and rhythm. No murmurs. Abdomen: Soft, nontender, nondistended. Positive bowel sounds.   Musculoskeletal: right knee postop dressing , hemovac in place  No rash      Laboratory Data: unspecified organism [facility])      ASSESSMENT/PLAN:  1.  Septic arthritis right knee with Jerrlyn Hasting  -2nd to lacertion from axe/for wood chopping 7/27  Aspiration 8/3, cx positive, SP I/D 8/6, cx positive    Presents with persistent/recurrent swe

## 2019-08-22 NOTE — PROGRESS NOTES
Pt anxious to speak with Dr Kenn Farr regarding his knee and abx treatment.  Mateo YOUNG, waiting for call back

## 2019-08-22 NOTE — PLAN OF CARE
Patient remains afebrile this shift. Drain in place to knee, small serosanguinous drainage to drain noted. Dressing remains CDI. Pain rated 6/10 consistently, discussed with Dr Yasmani Busch MD will adjust pain medication regimen.    Seen by Dr Estevan Wolf this am

## 2019-08-22 NOTE — PROGRESS NOTES
HAYLEY HOSPITALIST  Progress Note     Rissa Dang Patient Status:  Inpatient    3/16/1960 MRN [id number]   [facility] 3SW-A Attending Marti Dunn MD   Hosp Day # 1 PCP Hardin Cooks, MD     Chief Complaint: knee pain    S: Jayne Moder line: no    Estimated date of discharge: TBD  Discharge is dependent on: clinical stability  At this point Mr. Pearla Crigler is expected to be discharge to: home    Plan of care discussed with patient or family at bedside.     Laine Agosto MD

## 2019-08-22 NOTE — CM/SW NOTE
Met with pt who is familiar to me from 2 previous hospital admissions. Pt is a 60 y/o man admitted with septic knee. Pt is s/p I&D. He has been receiving home IV abx through [address] [place] [address]. [address] [place] [address] has also arranged for pt's home nursing visits.   Regla

## 2019-08-22 NOTE — CM/SW NOTE
62 yo admitted from orthopedic office. Hx of septic knee after injury while chopping wood. He had I and D on 8/6, discharged on IV abx.     Concern for increased pain and redness of knee at MD visit, admitted for another I and D last pm.  Orders from ID

## 2019-08-22 NOTE — BRIEF OP NOTE
Pre-Operative Diagnosis: Pyogenic arthritis, lower leg (HCC) [M00.9]     Post-Operative Diagnosis: same   Procedure Performed:   Procedure(s):  OPEN IRRIGATION AND DEBRIDEMENT RIGHT KNEE    Surgeon(s) and Role:     * Nate García MD - Primary    Assist

## 2019-08-22 NOTE — PAYOR COMM NOTE
--------------  ADMISSION REVIEW     Payor: [address] [place] [address]  Subscriber #:  [id number]  Authorization Number: [id number]    Admit date: 8/21/19  Admit time: 1255       Admitting Physician: Raúl Justice MD  Attending Physician:  Raúl Justice GENERAL: denies fever, change in weight  MUSCULOSKELETAL: as per HPI  SKIN: denies any unusual skin lesions or rashes  NEURO: denies numbness, tingling, or radiating pain     EXAM:   Ht 6' 2\" (1.88 m)   BMI 22.47 kg/m²   GENERAL: Alert and oriented, betzaida ATTENDING PHYSICIAN: Kelly Brunson M.D. OPERATING PHYSICIAN: Kelly Brunson M.D.    PATIENT ACCOUNT#:   [de-identified]    LOCATION:  [address] [place] [address]  MEDICAL RECORD #:   [id number]       YOB: 1960  ADMISSION DATE:       08/21/2019      OPER A time-out was performed confirming that the patient was the correct patient, the right knee was the correct knee, and open I and D was the correct procedure.     The prior incision was over the lateral knee, likely partially through the laceration.   This I closed the retinacular layer with 0 PDS staying outside of the knee joint, so going superficially in the retinaculum on both sides so as not to have any material inside the joint itself.   The subcutaneous layers were closed with 2-0 PDS and skin was clos Cardiovascular: S1, S2. Regular rate and rhythm. No murmurs  Abdomen: Soft, nontender, nondistended. Positive bowel sounds. No rebound or guarding. Neurologic: No focal neurological deficits.    Musculoskeletal: dec ROM RLE  Extremities: No edema     Diag Bupivacaine-EPINEPHrine (PF) (MARCAINE) 0.5% -1:[id number] injection     Date Action Dose Route User    8/21/2019 1846 Given 20 mL Infiltration Davi Thomas MD      fentaNYL citrate (SUBLIMAZE) 0.05 MG/ML injection 25 mcg     Date Action Dose Route User

## 2019-08-22 NOTE — OPERATIVE REPORT
[facility] [place]    PATIENT'S NAME: Blaze Mccracken   ATTENDING PHYSICIAN: Earnest Keith M.D. OPERATING PHYSICIAN: Earnest Keith M.D.    PATIENT ACCOUNT#:   [de-identified]    LOCATION:  [address] [place] [address]  MEDICAL RECORD #:   [id number]       DATE OF BIRTH: patient, the right knee was the correct knee, and open I and D was the correct procedure. The prior incision was over the lateral knee, likely partially through the laceration. This was a lazy Z configuration.   I went through the old incision but exten gauze, sterile gauze, ABD pads, sterile Webril, and an Ace wrap were applied. All counts were correct according to the nursing staff. The patient tolerated the procedure well with no intraoperative complications. COMPLICATIONS:  None.     DISPOSITION

## 2019-08-23 PROCEDURE — 99232 SBSQ HOSP IP/OBS MODERATE 35: CPT | Performed by: HOSPITALIST

## 2019-08-23 RX ORDER — POLYETHYLENE GLYCOL 3350 17 G/17G
17 POWDER, FOR SOLUTION ORAL DAILY
Status: DISCONTINUED | OUTPATIENT
Start: 2019-08-23 | End: 2019-08-24

## 2019-08-23 RX ORDER — MEROPENEM 1 G/1
1 INJECTION, POWDER, FOR SOLUTION INTRAVENOUS EVERY 12 HOURS
Qty: 68 G | Refills: 0 | Status: ON HOLD | OUTPATIENT
Start: 2019-08-23 | End: 2019-08-31

## 2019-08-23 RX ORDER — ALFUZOSIN HYDROCHLORIDE 10 MG/1
10 TABLET, EXTENDED RELEASE ORAL
Status: DISCONTINUED | OUTPATIENT
Start: 2019-08-23 | End: 2019-08-24

## 2019-08-23 NOTE — PROGRESS NOTES
[facility]                INFECTIOUS DISEASE PROGRESS NOTE    Brayden Francisco J Lissett Patient Status:  Inpatient    3/16/1960 MRN [id number]   [facility] 3SW-A Attending Natasha Connelly MD   Hosp Day # 2 PCP Betsy Pichardo MD     An (EKG)     Laboratory examination ordered as part of a routine general medical examination     Tear of medial cartilage or meniscus of knee, current     Screening for other and unspecified cardiovascular conditions     Osteoarthrosis, unspecified whether ge

## 2019-08-23 NOTE — PAYOR COMM NOTE
--------------  ADMISSION REVIEW     Payor: [address] [facility]  Subscriber #:  [id number]  Authorization Number: [id number]       H&P - H&P Note           CHIEF COMPLAINT: Knee Pain (right knee - 2 weeks 1 day s/p sx with Dr. Kathy Salvador)        DIAGNOSIS: Py have a little air in the lateral soft tissues           ASSESSMENT AND PLAN:   He had a hatchet wound over the lateral aspect of the knee developed septic arthritis he has been washed out on 8/6/2019.   He has current evidence for continued septic knee    infection. He will obviously need to stay on IV antibiotics. Informed consent was obtained to proceed with the procedure.      OPERATIVE TECHNIQUE:  The patient is already on IV antibiotics.   He was taken to the operative suite and placed supine on the o healthy-appearing material at this point. There was no further infected-appearing synovium. I then irrigated out with another 3 L of bacitracin-impregnated saline.   Again, the knee was inspected and no pockets of purulence were found anywhere.     I clos 8/21  2. Cont. Pain control, will inc pain regimen  3. Cont. IV ABX, ID to see     Plan of care: as above. Discussed with ID                      8/23 HOSP     1. Right Knee septic arthritis  1. S/p repeat debridement 8/21  2. Cont. Pain control  3.  On me

## 2019-08-23 NOTE — PROGRESS NOTES
HAYLEY HOSPITALIST  Progress Note     Jt Aiadnian Patient Status:  Inpatient    3/16/1960 MRN [id number]   [facility] 3SW-A Attending Suzi Gaona MD   Hosp Day # 2 PCP Oc Navarrete MD     Chief Complaint: knee pain    S: Cande Rushing def no need for further surgeries  · CODE status: full  · Barron: no  · Central line: no    Estimated date of discharge: TBD  Discharge is dependent on: clinical stability  At this point Mr. Porsche Steinberg is expected to be discharge to: home    Plan of care discu

## 2019-08-23 NOTE — PROGRESS NOTES
Patient c/o severe chills, very anxious about condition. Last temp 30 minutes ago 99 degrees, patient is not diaphoretic or hot to touch. Applied warm blankets, adjusted room temperature.  Asked patient to practice deep breathing exercises to aid with relax

## 2019-08-23 NOTE — PROGRESS NOTES
Patient c/o dribbling and frequency when urinating. Assisted patient to BR and standing at toilet. Patient able to void. PVR checked, 600 ml retained in bladder. Patient refusing st cath when offered. Paged Dr Yeni Santillan regarding urinary retention.

## 2019-08-23 NOTE — PLAN OF CARE
Low grade temps 99-99.5 this afternoon, if increases RN to get another set of blood cultures per Dr Carlos Sanchez. Continuing IV merrem per ID. PT anxious about pain and medications at times, needs reassurance.    Plan to home when ready, possibly over the weeke

## 2019-08-23 NOTE — PROGRESS NOTES
I saw Mr. Aniket Chavez last night at around 10 PM.  He was doing well. He had no complaints of significant pain or otherwise. Again today he is not having significant pain or anything in the knee. He has no other complaints today.     Exam:    I remove the

## 2019-08-23 NOTE — PAYOR COMM NOTE
--------------  DISCHARGE REVIEW    Payor: [facility]  Subscriber #:  [id number]  Authorization Number: [id number]    Admit date: 8/10/19  Admit time:  2625  Discharge Date: 8/12/2019  8:40 PM     Admitting Physician: MD Michael Davies in stable condition.      Procedures during hospitalization:   • None     Incidental or significant findings and recommendations (brief descriptions):  • Per Brief Synopsis of Hospital Course    Lab/Test results pending at Discharge:   · None     Consultant

## 2019-08-23 NOTE — PAYOR COMM NOTE
--------------  CONTINUED STAY REVIEW    Payor: [employer]  Subscriber #:  [id number]  Authorization Number: [id number]    Admit date: 8/21/19  Admit time: 1255    Admitting Physician: Usman Leong MD  Attending Physician:  Usman Leong, Disorders of bilirubin excretion     Pure hypercholesterolemia     Nonspecific abnormal electrocardiogram (ECG) (EKG)     Laboratory examination ordered as part of a routine general medical examination     Tear of medial cartilage or meniscus of knee, curr Danna Ha RN      Meropenem (MERREM) 500 mg in sodium chloride 0.9% 100 mL MBP     Date Action Dose Route User    8/23/2019 1130 New Bag 500 mg Intravenous Wilber Lopez RN    8/23/2019 0250 New Bag 500 mg Intravenous Jt Ahuja RN    8/22/20

## 2019-08-23 NOTE — PROGRESS NOTES
Dr Yuni Long at bedside, dressing to right knee changed. Continue with daily dry dressing changes per MD. OK for DC per ortho standpoint.  Patient to follow up in office on Tuesday or Wednesday with MD

## 2019-08-23 NOTE — PLAN OF CARE
Patients pain controlled with percocet. Right knee with surgical dressing. Hoke drain had 10cc out since 2200. Per patient drain fell out while he was sleeping. IV antibiotics as ordered. Afebrile. Plan of care reviewed. Safety precautions maintained.  Bed

## 2019-08-23 NOTE — CM/SW NOTE
Dr. Zahraa Pereira wrote script for final discharge IV medication. Sent script to [facility] via [place] and requested they get authorization for medication.      Savannah Johnson RN, [address] [place] [address]  Extension [phone]

## 2019-08-24 VITALS
RESPIRATION RATE: 20 BRPM | TEMPERATURE: 98 F | HEART RATE: 63 BPM | BODY MASS INDEX: 22 KG/M2 | SYSTOLIC BLOOD PRESSURE: 111 MMHG | DIASTOLIC BLOOD PRESSURE: 63 MMHG | WEIGHT: 172.19 LBS | OXYGEN SATURATION: 91 %

## 2019-08-24 PROBLEM — M00.9 PYOGENIC ARTHRITIS, LOWER LEG (HCC): Status: ACTIVE | Noted: 2019-08-10

## 2019-08-24 PROCEDURE — 99232 SBSQ HOSP IP/OBS MODERATE 35: CPT | Performed by: HOSPITALIST

## 2019-08-24 RX ORDER — ENOXAPARIN SODIUM 100 MG/ML
40 INJECTION SUBCUTANEOUS DAILY
Status: DISCONTINUED | OUTPATIENT
Start: 2019-08-24 | End: 2019-08-24

## 2019-08-24 RX ORDER — OXYCODONE HYDROCHLORIDE AND ACETAMINOPHEN 5; 325 MG/1; MG/1
1 TABLET ORAL EVERY 4 HOURS PRN
Qty: 30 TABLET | Refills: 0 | Status: ON HOLD | OUTPATIENT
Start: 2019-08-24 | End: 2019-08-31

## 2019-08-24 NOTE — PROGRESS NOTES
1443: Dr Julieta Jewell paged for Percocet Rx and to have MD complete dc med rec. Awaiting response. 1545: Spoke to Dr Julieta Jewell. Will send new page once verified that home health and atbx have been arranged.     1610: page sent to Dr Julieta Jewell that [facility] and IV atbx hav

## 2019-08-24 NOTE — PLAN OF CARE
Pt a&O. On room air. Tolerating diet with good appetite. Last MB 8/21/19. Miralax given this AM. Voiding w/o difficulty. Pain managed with oral medications. Pain managed with oral medications. Right knee surgical drsg C/D/I. Drsg changed today.  Tolerating

## 2019-08-24 NOTE — PHYSICAL THERAPY NOTE
PHYSICAL THERAPY QUICK EVALUATION - INPATIENT    Room Number: 355/355-A  Evaluation Date: 8/24/2019  Presenting Problem: I&D R septic knee  Physician Order: PT Eval and Treat    Problem List  Active Problems:    Pyogenic arthritis, lower leg (HCC)      P ACTIVITY TOLERANCE                         O2 WALK     SPO2 Ambulation on Room Air: 95            AM-PAC '6-Clicks' INPATIENT SHORT FORM - BASIC MOBILITY  How much difficulty does the patient currently have. ..  -   Turning over in bed (incl re-order if a new functional limitation presents during this admission. GOALS  Patient was able to achieve the following goals . ..     Patient was able to transfer At previous, functional level   Patient able to ambulate on level surfaces At previous, fu

## 2019-08-24 NOTE — CM/SW NOTE
08/24/19 1500   Discharge disposition   Expected discharge disposition Home-Health   Name of Facillity/Home Care/Hospice   ([facility])   Discharge transportation [id number] [phone] confirmed they will make arrangements with lana

## 2019-08-24 NOTE — PROGRESS NOTES
HAYLEY HOSPITALIST  Progress Note     Nilsa Mistry Patient Status:  Inpatient    3/16/1960 MRN [id number]   [facility] 3SW-A Attending Bart Shah MD   Hosp Day # 3 PCP Nicolle Wolf MD     Chief Complaint: knee pain    S: Marcela Downy as above. Doing well. No plans for further surgical interventions. Plan to DC on IV ABX when able.     Quality:  · DVT Prophylaxis: lovenox  · CODE status: full  · Barron: no  · Central line: no    Estimated date of discharge: TBD  Discharge is dependent on

## 2019-08-24 NOTE — PROGRESS NOTES
[facility]                INFECTIOUS DISEASE PROGRESS NOTE    [facility] Patient Status:  Inpatient    3/16/1960 MRN [id number]   [facility] 3SW-A Attending Michelle Greco MD   Hosp Day # 3 PCP Rufus Hatchet, MD     An electrocardiogram (ECG) (EKG)     Laboratory examination ordered as part of a routine general medical examination     Tear of medial cartilage or meniscus of knee, current     Screening for other and unspecified cardiovascular conditions     Osteoarthrosis

## 2019-08-26 ENCOUNTER — PATIENT OUTREACH (OUTPATIENT)
Dept: CASE MANAGEMENT | Age: 59
End: 2019-08-26

## 2019-08-28 ENCOUNTER — ANESTHESIA EVENT (OUTPATIENT)
Dept: SURGERY | Facility: HOSPITAL | Age: 59
End: 2019-08-28

## 2019-08-28 ENCOUNTER — HOSPITAL ENCOUNTER (INPATIENT)
Facility: HOSPITAL | Age: 59
LOS: 3 days | Discharge: HOME | DRG: 489 | End: 2019-08-31
Attending: INTERNAL MEDICINE | Admitting: HOSPITALIST
Payer: COMMERCIAL

## 2019-08-28 ENCOUNTER — ANESTHESIA (OUTPATIENT)
Dept: SURGERY | Facility: HOSPITAL | Age: 59
End: 2019-08-28

## 2019-08-28 ENCOUNTER — LAB ENCOUNTER (OUTPATIENT)
Dept: LAB | Age: 59
End: 2019-08-28
Attending: ORTHOPAEDIC SURGERY
Payer: COMMERCIAL

## 2019-08-28 DIAGNOSIS — M00.9 SEPTIC ARTHRITIS OF KNEE, RIGHT (HCC): Primary | ICD-10-CM

## 2019-08-28 DIAGNOSIS — R52 PAIN: ICD-10-CM

## 2019-08-28 DIAGNOSIS — M00.9 PYOGENIC ARTHRITIS OF RIGHT KNEE JOINT, DUE TO UNSPECIFIED ORGANISM (HCC): ICD-10-CM

## 2019-08-28 PROCEDURE — 3E1U38Z IRRIGATION OF JOINTS USING IRRIGATING SUBSTANCE, PERCUTANEOUS APPROACH: ICD-10-PCS | Performed by: ORTHOPAEDIC SURGERY

## 2019-08-28 PROCEDURE — 0JDN0ZZ EXTRACTION OF RIGHT LOWER LEG SUBCUTANEOUS TISSUE AND FASCIA, OPEN APPROACH: ICD-10-PCS | Performed by: ORTHOPAEDIC SURGERY

## 2019-08-28 PROCEDURE — 0S9C00Z DRAINAGE OF RIGHT KNEE JOINT WITH DRAINAGE DEVICE, OPEN APPROACH: ICD-10-PCS | Performed by: ORTHOPAEDIC SURGERY

## 2019-08-28 PROCEDURE — 3E0T3BZ INTRODUCTION OF ANESTHETIC AGENT INTO PERIPHERAL NERVES AND PLEXI, PERCUTANEOUS APPROACH: ICD-10-PCS | Performed by: ANESTHESIOLOGY

## 2019-08-28 PROCEDURE — 87070 CULTURE OTHR SPECIMN AEROBIC: CPT

## 2019-08-28 PROCEDURE — 87205 SMEAR GRAM STAIN: CPT

## 2019-08-28 PROCEDURE — 87075 CULTR BACTERIA EXCEPT BLOOD: CPT

## 2019-08-28 PROCEDURE — 99223 1ST HOSP IP/OBS HIGH 75: CPT | Performed by: HOSPITALIST

## 2019-08-28 RX ORDER — METOCLOPRAMIDE HYDROCHLORIDE 5 MG/ML
10 INJECTION INTRAMUSCULAR; INTRAVENOUS AS NEEDED
Status: DISCONTINUED | OUTPATIENT
Start: 2019-08-28 | End: 2019-08-28 | Stop reason: HOSPADM

## 2019-08-28 RX ORDER — METOCLOPRAMIDE HYDROCHLORIDE 5 MG/ML
10 INJECTION INTRAMUSCULAR; INTRAVENOUS EVERY 8 HOURS PRN
Status: DISCONTINUED | OUTPATIENT
Start: 2019-08-28 | End: 2019-08-31

## 2019-08-28 RX ORDER — ONDANSETRON 2 MG/ML
4 INJECTION INTRAMUSCULAR; INTRAVENOUS AS NEEDED
Status: DISCONTINUED | OUTPATIENT
Start: 2019-08-28 | End: 2019-08-28 | Stop reason: HOSPADM

## 2019-08-28 RX ORDER — BACITRACIN 50000 [USP'U]/1
INJECTION, POWDER, LYOPHILIZED, FOR SOLUTION INTRAMUSCULAR AS NEEDED
Status: DISCONTINUED | OUTPATIENT
Start: 2019-08-28 | End: 2019-08-28 | Stop reason: HOSPADM

## 2019-08-28 RX ORDER — OXYCODONE HYDROCHLORIDE AND ACETAMINOPHEN 5; 325 MG/1; MG/1
1 TABLET ORAL EVERY 4 HOURS PRN
Status: DISCONTINUED | OUTPATIENT
Start: 2019-08-28 | End: 2019-08-31

## 2019-08-28 RX ORDER — HYDROMORPHONE HYDROCHLORIDE 1 MG/ML
INJECTION, SOLUTION INTRAMUSCULAR; INTRAVENOUS; SUBCUTANEOUS
Status: COMPLETED
Start: 2019-08-28 | End: 2019-08-28

## 2019-08-28 RX ORDER — HYDROCODONE BITARTRATE AND ACETAMINOPHEN 5; 325 MG/1; MG/1
1 TABLET ORAL AS NEEDED
Status: DISCONTINUED | OUTPATIENT
Start: 2019-08-28 | End: 2019-08-28 | Stop reason: HOSPADM

## 2019-08-28 RX ORDER — SODIUM CHLORIDE, SODIUM LACTATE, POTASSIUM CHLORIDE, CALCIUM CHLORIDE 600; 310; 30; 20 MG/100ML; MG/100ML; MG/100ML; MG/100ML
INJECTION, SOLUTION INTRAVENOUS CONTINUOUS
Status: DISCONTINUED | OUTPATIENT
Start: 2019-08-28 | End: 2019-08-28 | Stop reason: HOSPADM

## 2019-08-28 RX ORDER — SODIUM CHLORIDE 9 MG/ML
INJECTION, SOLUTION INTRAVENOUS CONTINUOUS
Status: DISCONTINUED | OUTPATIENT
Start: 2019-08-28 | End: 2019-08-28 | Stop reason: HOSPADM

## 2019-08-28 RX ORDER — ENOXAPARIN SODIUM 100 MG/ML
40 INJECTION SUBCUTANEOUS DAILY
Status: DISCONTINUED | OUTPATIENT
Start: 2019-08-28 | End: 2019-08-28 | Stop reason: HOSPADM

## 2019-08-28 RX ORDER — DIPHENHYDRAMINE HYDROCHLORIDE 50 MG/ML
12.5 INJECTION INTRAMUSCULAR; INTRAVENOUS AS NEEDED
Status: DISCONTINUED | OUTPATIENT
Start: 2019-08-28 | End: 2019-08-28 | Stop reason: HOSPADM

## 2019-08-28 RX ORDER — ONDANSETRON 2 MG/ML
4 INJECTION INTRAMUSCULAR; INTRAVENOUS EVERY 6 HOURS PRN
Status: ACTIVE | OUTPATIENT
Start: 2019-08-28 | End: 2019-08-29

## 2019-08-28 RX ORDER — METOCLOPRAMIDE HYDROCHLORIDE 5 MG/ML
10 INJECTION INTRAMUSCULAR; INTRAVENOUS EVERY 6 HOURS PRN
Status: DISCONTINUED | OUTPATIENT
Start: 2019-08-28 | End: 2019-08-31

## 2019-08-28 RX ORDER — HYDROCODONE BITARTRATE AND ACETAMINOPHEN 5; 325 MG/1; MG/1
2 TABLET ORAL AS NEEDED
Status: DISCONTINUED | OUTPATIENT
Start: 2019-08-28 | End: 2019-08-28 | Stop reason: HOSPADM

## 2019-08-28 RX ORDER — VANCOMYCIN HYDROCHLORIDE 500 MG/10ML
INJECTION, POWDER, LYOPHILIZED, FOR SOLUTION INTRAVENOUS CONTINUOUS PRN
Status: COMPLETED | OUTPATIENT
Start: 2019-08-28 | End: 2019-08-28

## 2019-08-28 RX ORDER — HYDROMORPHONE HYDROCHLORIDE 1 MG/ML
0.4 INJECTION, SOLUTION INTRAMUSCULAR; INTRAVENOUS; SUBCUTANEOUS EVERY 5 MIN PRN
Status: DISCONTINUED | OUTPATIENT
Start: 2019-08-28 | End: 2019-08-28 | Stop reason: HOSPADM

## 2019-08-28 RX ORDER — LABETALOL HYDROCHLORIDE 5 MG/ML
5 INJECTION, SOLUTION INTRAVENOUS EVERY 5 MIN PRN
Status: DISCONTINUED | OUTPATIENT
Start: 2019-08-28 | End: 2019-08-28 | Stop reason: HOSPADM

## 2019-08-28 RX ORDER — MORPHINE SULFATE 4 MG/ML
2 INJECTION, SOLUTION INTRAMUSCULAR; INTRAVENOUS EVERY 2 HOUR PRN
Status: DISCONTINUED | OUTPATIENT
Start: 2019-08-28 | End: 2019-08-31

## 2019-08-28 RX ORDER — MORPHINE SULFATE 4 MG/ML
1 INJECTION, SOLUTION INTRAMUSCULAR; INTRAVENOUS EVERY 2 HOUR PRN
Status: DISCONTINUED | OUTPATIENT
Start: 2019-08-28 | End: 2019-08-31

## 2019-08-28 RX ORDER — ACETAMINOPHEN 325 MG/1
650 TABLET ORAL EVERY 6 HOURS PRN
Status: DISCONTINUED | OUTPATIENT
Start: 2019-08-28 | End: 2019-08-31

## 2019-08-28 RX ORDER — NALOXONE HYDROCHLORIDE 0.4 MG/ML
80 INJECTION, SOLUTION INTRAMUSCULAR; INTRAVENOUS; SUBCUTANEOUS AS NEEDED
Status: DISCONTINUED | OUTPATIENT
Start: 2019-08-28 | End: 2019-08-28 | Stop reason: HOSPADM

## 2019-08-28 RX ORDER — ONDANSETRON 2 MG/ML
4 INJECTION INTRAMUSCULAR; INTRAVENOUS EVERY 6 HOURS PRN
Status: DISCONTINUED | OUTPATIENT
Start: 2019-08-28 | End: 2019-08-31

## 2019-08-28 NOTE — DISCHARGE SUMMARY
[facility]    PATIENT'S NAME: Mallorie Bowens   ATTENDING PHYSICIAN: Jefe Colon M.D.    PATIENT ACCOUNT#:   [de-identified]    LOCATION:  [address] [place] [address]  MEDICAL RECORD #:   [id number]       YOB: 1960  ADMISSION DATE:       08/21/201

## 2019-08-28 NOTE — CONSULTS
HAYLEY HOSPITALIST  History and Physical     Virgle Fears Patient Status:  Inpatient    3/16/1960 MRN [id number]   [facility] PERIOPERATIVE Attending Carline Wong MD   Hosp Day # 0 PCP Samanta Monzon MD     Chief Complaint: Righ reports that he has been smoking cigarettes. He has a 5.00 pack-year smoking history. He quit smokeless tobacco use about 4 weeks ago. He reports that he does not drink alcohol or use drugs.     Family History:   Family History   Problem Relation Age of Ons Psychiatric: Appropriate mood and affect.       Diagnostic Data:      Labs:  Recent Labs   Lab 08/22/19  1110   WBC 11.4*   HGB 11.8*   MCV 88.5   .0       Recent Labs   Lab 08/22/19  1110   GLU 89   BUN 13   CREATSERUM 0.54*   GFRAA 133   GFRNAA 1

## 2019-08-28 NOTE — H&P
Sarina Santana  8/28/2019   CHIEF COMPLAINT   No chief complaint on file.        HISTORY OF PRESENT ILLNESS   Sarina Santana is a 61year old male who unfortunately cut his right knee with a hatchet in the beginning of August.  He was washed out and sew SWELLING    Comment:Swelling to arm, states he takes benadryl. Pt             denies needing epi pen  Ceftriaxone             HIVES, SWELLING    Comment:Swelling to joints    Past Medical History:  No past medical history on file.     Current Medications: Caffeine Concern: No          2 coffee        Exercise: Yes          active        Seat Belt: Yes      Family History:  Family History   Problem Relation Age of Onset   • Lipids Father    • Heart Disorder Maternal Grandfather         MI 76   • Other (O surgery tonight. Discussed with the patient the need to do an I&D given my partners concern and copy to history of the right knee. I will open the previous incision and likely extend even more distally and proximally.   I washed out with 9 L of normal sal

## 2019-08-28 NOTE — PLAN OF CARE
Direct admit fr Dr. Kennedy Links office. Npo since early am.  Admitted via wheelchair. Wife at bedside. Down to OR - Dr. Brannon Rod to see.

## 2019-08-29 LAB
ANION GAP SERPL CALC-SCNC: 7 MMOL/L (ref 0–18)
BUN BLD-MCNC: 24 MG/DL (ref 7–18)
BUN/CREAT SERPL: 25.3 (ref 10–20)
CALCIUM BLD-MCNC: 8.5 MG/DL (ref 8.5–10.1)
CHLORIDE SERPL-SCNC: 105 MMOL/L (ref 98–112)
CO2 SERPL-SCNC: 29 MMOL/L (ref 21–32)
CREAT BLD-MCNC: 0.95 MG/DL (ref 0.7–1.3)
DEPRECATED RDW RBC AUTO: 38.6 FL (ref 35.1–46.3)
ERYTHROCYTE [DISTWIDTH] IN BLOOD BY AUTOMATED COUNT: 11.9 % (ref 11–15)
GLUCOSE BLD-MCNC: 122 MG/DL (ref 70–99)
HCT VFR BLD AUTO: 34.2 % (ref 39–53)
HGB BLD-MCNC: 11.4 G/DL (ref 13–17.5)
MCH RBC QN AUTO: 29.3 PG (ref 26–34)
MCHC RBC AUTO-ENTMCNC: 33.3 G/DL (ref 31–37)
MCV RBC AUTO: 87.9 FL (ref 80–100)
OSMOLALITY SERPL CALC.SUM OF ELEC: 297 MOSM/KG (ref 275–295)
PLATELET # BLD AUTO: 367 10(3)UL (ref 150–450)
POTASSIUM SERPL-SCNC: 3.9 MMOL/L (ref 3.5–5.1)
RBC # BLD AUTO: 3.89 X10(6)UL (ref 4.3–5.7)
SODIUM SERPL-SCNC: 141 MMOL/L (ref 136–145)
WBC # BLD AUTO: 8.8 X10(3) UL (ref 4–11)

## 2019-08-29 PROCEDURE — 99232 SBSQ HOSP IP/OBS MODERATE 35: CPT | Performed by: HOSPITALIST

## 2019-08-29 RX ORDER — ALFUZOSIN HYDROCHLORIDE 10 MG/1
10 TABLET, EXTENDED RELEASE ORAL
Status: DISCONTINUED | OUTPATIENT
Start: 2019-08-29 | End: 2019-08-31

## 2019-08-29 NOTE — ANESTHESIA PREPROCEDURE EVALUATION
PRE-OP EVALUATION    Patient Name: Alessia Bauer    Pre-op Diagnosis: Pain [R52]    Procedure(s):  EXTREMITY LOWER IRRIGATION & DEBRIDEMENT RIGHT KNEE    Surgeon(s) and Role:     Jeanie Estevez MD - Primary    Pre-op vitals reviewed.   Temp: 98.8 °F diphenhydrAMINE HCl (BENADRYL) injection 12.5 mg 12.5 mg Intravenous PRN   Vancomycin HCl (VANCOCIN) injection   Continuous PRN       Outpatient Medications:    oxyCODONE-acetaminophen 5-325 MG Oral Tab Take 1 tablet by mouth every 4 (four) hours as need 7/27/2019    Alcohol use: No      Frequency: Never      Binge frequency: Never      Drug use: No     Available pre-op labs reviewed.   Lab Results   Component Value Date    WBC 11.4 (H) 08/22/2019    RBC 4.00 (L) 08/22/2019    HGB 11.8 (L) 08/22/2019    HCT

## 2019-08-29 NOTE — CONSULTS
Leslie Galeana  8/28/2019   CHIEF COMPLAINT   No chief complaint on file.         HISTORY OF PRESENT ILLNESS   Karley Correa is a 61year old male who unfortunately cut his right knee with a hatchet in the beginning of August.  He was washed out and s SWELLING    Comment:Swelling to arm, states he takes benadryl.   Pt             denies needing epi pen  Ceftriaxone             HIVES, SWELLING    Comment:Swelling to joints     Past Medical History:  No past medical history on file.     Current Medications Partners: Female    Other Topics      Concerns:        Caffeine Concern: No          2 coffee        Exercise: Yes          active        Seat Belt:  Yes        Family History:        Family History   Problem Relation Age of Onset   • Lipids Father     • tonight. Disposition: The patient has an emergent condition that requires surgery tonight. Discussed with the patient the need to do an I&D given my partners concern and copy to history of the right knee.   I will open the previous incision and likely ex

## 2019-08-29 NOTE — ANESTHESIA POSTPROCEDURE EVALUATION
[facility] Patient Status:  Inpatient   Age/Gender 61year old male MRN [id number]   Location [address] [facility] Attending Lauryn Waddell MD   Hosp Day # 0 PCP Marylu Das MD       Anesthesia Post-op No

## 2019-08-29 NOTE — ADDENDUM NOTE
Addendum  created 08/29/19 0729 by Chon Rubin MD    ED Visit Navigator Clinical Impressions section accepted

## 2019-08-29 NOTE — PLAN OF CARE
Patient received from PACU. He is AOX4. Pain is moderate on his right knee. Pain management plan explained, verbalized understanding. Acewrap dressing is C/D/I with davol drain draining serosanguineous amt of drainage.  KI on @ all times - can be loosen up

## 2019-08-29 NOTE — PROGRESS NOTES
NURSING ADMISSION NOTE      Patient admitted via bed  Oriented to room. Safety precautions initiated. Bed in low position. Call light in reach.   Dr. Marcela Wilkins was paged re consult awaiting call back

## 2019-08-29 NOTE — PROGRESS NOTES
[facility] Note: Antimicrobial Weight Dose Adjustment for: meropenem    Jen Wiseman is a 61year old male who has been prescribed meropenem (MERREM) 1000 mg every 8 hours. CrCl estimated is 162ml/min and pt has a weight/BMI of 78.1 kg/22.11 kg/m2.

## 2019-08-29 NOTE — PROGRESS NOTES
S: patient is doing fine, pain is controlled. No new issues overnight. O: NAD, AAOx3     08/29/19  0400   BP: 112/73   Pulse: 65   Resp: 18   Temp: 98.3 °F (36.8 °C)         RLE:drsg c/d/i,  in knee immobilizer, drain in place.  NVI distally     Output

## 2019-08-29 NOTE — PROGRESS NOTES
HAYLEY HOSPITALIST  Progress Note     Marcio Vaughan Patient Status:  Inpatient    3/16/1960 MRN [id number]   [facility] 3SW-A Attending Bryan Garcia MD   Hosp Day # 1 PCP Marie Nunez MD     Chief Complaint: sepsis   S:  Had t Alicia Pompa MD

## 2019-08-29 NOTE — OPERATIVE REPORT
Operative Note    Patient: Maira Peacock MRN: [id number]     YOB: 1960  Age: 61year old  Sex: male    Unit: [facility] OR Room/Bed: [facility] OR [place] ROOMS/ OR P* Location: [facility]     Date of Procedure: 8/28/2019     Preoperative Diagnosis:   Sept aspirated another effusion which he said contained a pus-like fluid. Unfortunately Dr. Lin Murry had a personal emergency and he called me to help him with watching this patient's recurrent septic knee tonight.   I agreed to do this and will plan on washing patient. He was then brought back to the operating room at which point he was given general anesthesia. A left-sided SCD was placed. An upper leg tourniquet was placed on the right side.   The right leg was then prepped and draped in usual and sterile fa irrigation was complete we then closed the capsular arthrotomy tissue using multiple 0–PDS stitches. The subcutaneous tissue was closed with 2-0 Monocryl and the skin was closed with staples.   Prior to closure I did place a Hemovac drain in the suprapatel

## 2019-08-29 NOTE — PAYOR COMM NOTE
--------------  ADMISSION REVIEW     Payor: [facility]/HMO/POS/EPO  Subscriber #:  [id number]  Authorization Number: [id number]         H&P - H&P Note         HISTORY OF PRESENT ILLNESS   Alessia Bauer is a 61year old male who unfortunatel an emergent I&D tonight. MEDICAL HISTORY   Allergies:    Beef-Derived Produc*    SWELLING    Comment:Swelling to arm, states he takes benadryl.   Pt             denies needing epi pen  Ceftriaxone             HIVES, SWELLING    Comment:Swelling to joint No      Sexual activity: Yes        Partners: Female    Other Topics      Concerns:        Caffeine Concern: No          2 coffee        Exercise: Yes          active        Seat Belt: Yes       REVIEW OF SYSTEMS   Constitutional:  Denies fever, chills  HE incision and likely extend even more distally and proximally. I washed out with 9 L of normal saline. I explained that I will aspirate the knee prior to doing this we can get a good clean culture to send for pathology and microbiology.   I will also send was transferred to Dr. Fozia Cardozo who then continue his postoperative evaluation. Jenna Fletcher last week he was evaluated by him in the clinic again found to have a knee effusion which he aspirated and showed concerns for recurrent infection.  He was again formal  We will consult ID postoperatively he is to continue the antibiotics he is on currently.     The risk and benefits of surgery including bleeding, infection, neurovascular injury, recurrent infection, chronic pain, chronic stiffness, the development of pos evacuated that appeared to have blood, fat necrosis, and a chronic inflammatory component to it. There is also what appeared to be pus as well. I did extend my incision proximally as well as the arthrotomy to get better exposure of the joint.   I then irr -- infection with Jocy  is a a(n) 61year old male suffered a laceration to his right knee on 7/27/19 from a chopping axe at home. He lost control and cut into his right knee.  He went to urgent care and had sutures malena % — None (Room air) —           08/29/19 0759 98.6 °F (37 °C) 65 20 107/69 95 % — None (Room air         08/28/19 1700 98.8 °F (37.1 °C) 75 16 119/67 97 % — None (Room air                 Appropriate for inpatient status per guidelines for septic knee, rec

## 2019-08-29 NOTE — PROGRESS NOTES
Bladder scan done - see flowsheet. Pt refused straight cath at this time. 4324: Clarification done with Dr Dora Navas. No anticoag post op, just SCD. However sx was informed that pt is refusing it.  Davol drain to stay in, no need to pull it out for pt is g

## 2019-08-30 LAB
DEPRECATED RDW RBC AUTO: 38.3 FL (ref 35.1–46.3)
ERYTHROCYTE [DISTWIDTH] IN BLOOD BY AUTOMATED COUNT: 11.9 % (ref 11–15)
HCT VFR BLD AUTO: 35 % (ref 39–53)
HGB BLD-MCNC: 11.7 G/DL (ref 13–17.5)
MCH RBC QN AUTO: 29.1 PG (ref 26–34)
MCHC RBC AUTO-ENTMCNC: 33.4 G/DL (ref 31–37)
MCV RBC AUTO: 87.1 FL (ref 80–100)
PLATELET # BLD AUTO: 332 10(3)UL (ref 150–450)
RBC # BLD AUTO: 4.02 X10(6)UL (ref 4.3–5.7)
WBC # BLD AUTO: 10 X10(3) UL (ref 4–11)

## 2019-08-30 PROCEDURE — 99232 SBSQ HOSP IP/OBS MODERATE 35: CPT | Performed by: HOSPITALIST

## 2019-08-30 NOTE — PLAN OF CARE
Patient remains NPO per orders. Declines SCDs- continued education on DVT risks and complications, patient in agreement to perform ankle exercises. PICC patent. Pain controlled with current routine, wishes for minimal pain medication.  C/O abd and back pa

## 2019-08-30 NOTE — PROGRESS NOTES
[facility]  Progress Note     Karley Fears Patient Status:  Inpatient    3/16/1960 MRN [id number]   [facility] 3SW-A Attending Aline Lewis MD   Hosp Day # 2 PCP Samanta Monzon MD     Chief Complaint: sepsis   S:  No fe

## 2019-08-30 NOTE — PLAN OF CARE
Patient states pain controlled with current routine, wishes to take minimal pain medication. Patient w am post void residual- see flowsheet- declines straight cath, educated on risks. Patient started on uroxatrol- able to fully urinate.    Plan for OR lucero

## 2019-08-30 NOTE — PLAN OF CARE
Alert and oriented,V/S stable,on room air,rt.leg with knee immobilizer,kept at all times. Drain intact,with bloody drainage,small amount. Will be NPO after 5am,for repeat I & D towards the afternoon. Antibiotic as ordered,tolerating well.
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
## 2019-08-30 NOTE — PROGRESS NOTES
Anastacio Raleigh Morrison was evaluated in the preoperative holding area. We were planning on doing a repeat irrigation and debridement of the right knee. He said the knee was feeling good. He is wondering if we have to do another I&D.   He would prefer not to if at a

## 2019-08-30 NOTE — PLAN OF CARE
Patient returned to unit via bed and transport from OR holding. Patient and spouse report that per Dr. Yuni Long, no surgery needed and plan for DC tomorrow. Patient states that Dr. Yuni Long removed drain. Note from Dr. Yuni Long reviewed.    Patient and spous

## 2019-08-31 VITALS
HEART RATE: 65 BPM | OXYGEN SATURATION: 98 % | BODY MASS INDEX: 22 KG/M2 | WEIGHT: 169.88 LBS | DIASTOLIC BLOOD PRESSURE: 65 MMHG | SYSTOLIC BLOOD PRESSURE: 106 MMHG | TEMPERATURE: 99 F | RESPIRATION RATE: 18 BRPM

## 2019-08-31 LAB
DEPRECATED RDW RBC AUTO: 38.4 FL (ref 35.1–46.3)
ERYTHROCYTE [DISTWIDTH] IN BLOOD BY AUTOMATED COUNT: 11.8 % (ref 11–15)
HCT VFR BLD AUTO: 35.1 % (ref 39–53)
HGB BLD-MCNC: 11.5 G/DL (ref 13–17.5)
MCH RBC QN AUTO: 28.9 PG (ref 26–34)
MCHC RBC AUTO-ENTMCNC: 32.8 G/DL (ref 31–37)
MCV RBC AUTO: 88.2 FL (ref 80–100)
PLATELET # BLD AUTO: 310 10(3)UL (ref 150–450)
RBC # BLD AUTO: 3.98 X10(6)UL (ref 4.3–5.7)
WBC # BLD AUTO: 9.5 X10(3) UL (ref 4–11)

## 2019-08-31 PROCEDURE — 99239 HOSP IP/OBS DSCHRG MGMT >30: CPT | Performed by: HOSPITALIST

## 2019-08-31 RX ORDER — OXYCODONE HYDROCHLORIDE AND ACETAMINOPHEN 5; 325 MG/1; MG/1
1 TABLET ORAL EVERY 4 HOURS PRN
Qty: 20 TABLET | Refills: 0 | Status: SHIPPED | OUTPATIENT
Start: 2019-08-31 | End: 2019-10-22 | Stop reason: ALTCHOICE

## 2019-08-31 RX ORDER — MEROPENEM 1 G/1
1 INJECTION, POWDER, FOR SOLUTION INTRAVENOUS EVERY 12 HOURS
Qty: 62 G | Refills: 0 | Status: SHIPPED | OUTPATIENT
Start: 2019-09-01 | End: 2019-10-02

## 2019-08-31 NOTE — PROGRESS NOTES
[facility]                INFECTIOUS DISEASE PROGRESS NOTE    Lamar Galeana Patient Status:  Inpatient    3/16/1960 MRN [id number]   [facility] 3SW-A Attending Crow Hays MD   Hosp Day # 3 PCP Kuldeep Hammer MD     An examination     Tear of medial cartilage or meniscus of knee, current     Screening for other and unspecified cardiovascular conditions     Osteoarthrosis, unspecified whether generalized or localized, lower leg     Tear of lateral cartilage or meniscus of

## 2019-08-31 NOTE — PLAN OF CARE
V/S stable,alert and oriented. PICC line intact,flushes well. Remains on IV antibiotic. RT.LOWER EXTREMITY ELEVATED on pillows,CMS INTACT,on knee immobilizer as ordered. Medicated for pain,with adequate relief. Plan for discharge today.

## 2019-08-31 NOTE — PROGRESS NOTES
[facility]  Orthopedic Surgery  Progress Note    Kenyatta Pap Patient Status:  Inpatient    3/16/1960 MRN [id number]   [facility] 3SW-A Attending Ruel Hebert MD   Hosp Day # 3 PCP Yogi Sy MD     SUBJECTIVE:  INT

## 2019-08-31 NOTE — CM/SW NOTE
08/31/19 1315   Discharge disposition   Expected discharge disposition Home or Self   Name of Facillity/Home Care/Hospice   ([facility]) rn and infusion. Script sent via ecin to [facility].  Confirmed they will deliver medication late tonight for to

## 2019-08-31 NOTE — PROGRESS NOTES
[facility]                INFECTIOUS DISEASE PROGRESS NOTE    Maribel Galeana Patient Status:  Inpatient    3/16/1960 MRN [id number]   [facility] 3SW-A Attending Usman Leong MD   Hosp Day # 3 PCP Jillian Neumann MD     An reviewed:  Patient Active Problem List:     Other abnormal blood chemistry     Disorders of bilirubin excretion     Pure hypercholesterolemia     Nonspecific abnormal electrocardiogram (ECG) (EKG)     Laboratory examination ordered as part of a routine gen

## 2019-08-31 NOTE — PROGRESS NOTES
[facility]  Progress Note     Violeta Shall Patient Status:  Inpatient    3/16/1960 MRN [id number]   [facility] 3SW-A Attending Emely Saucedo MD   Hosp Day # 3 PCP Alma Gonzalez MD     Chief Complaint: sepsis   S:  No fe

## 2019-08-31 NOTE — PLAN OF CARE
Poss d/c later today, denies pain, vss, knee immobilizer in place over rt knee dsg, updated on plan of care.

## 2019-09-01 NOTE — CDS QUERY
Patient:  Sarina Santana        Acct#:  [de-identified]  :  [birth year]          CSN#:  [id number]  DOS:  19  Clarification of Procedure – Debridement   [place]  Dear Dr. Cleveland Fontana:    Clinical information (provided below) i the pulse lavage. We were down to nice healthy-appearing material at this point.   There was no further infected-appearing synovium…”            For questions regarding this query, please contact Clinical Documentation :   Sinan Munoz

## 2019-09-03 ENCOUNTER — PATIENT OUTREACH (OUTPATIENT)
Dept: CASE MANAGEMENT | Age: 59
End: 2019-09-03

## 2019-09-03 DIAGNOSIS — M00.9 PYOGENIC ARTHRITIS OF RIGHT KNEE JOINT, DUE TO UNSPECIFIED ORGANISM (HCC): ICD-10-CM

## 2019-09-03 DIAGNOSIS — Z02.9 ENCOUNTERS FOR ADMINISTRATIVE PURPOSE: ICD-10-CM

## 2019-09-03 PROCEDURE — 1111F DSCHRG MED/CURRENT MED MERGE: CPT

## 2019-09-04 NOTE — PROGRESS NOTES
Initial Post Discharge Follow Up   Discharge Date: 8/31/19  Contact Date: 9/3/2019    Consent Verification:  Assessment Completed With: Patient  HIPAA Verified?   Yes    Discharge Dx:   Septic Arthritis Right Knee    General:   • How have you been since y changes  • May I go over your medications with you to make sure we are not missing anything? yes  • Are there any reasons that keep you from taking your medication as prescribed? No  Are you having any concerns with constipation?  No    Referrals/orders at [facility] at that time. He denies having any questions or concerns at this time. [x]  Discharge Summary, Discharge medications reviewed/discussed/and reconciled against outpatient medications with patient,  and orders reviewed and discussed.  Any change

## 2019-09-10 ENCOUNTER — PATIENT OUTREACH (OUTPATIENT)
Dept: INFECTIOUS DISEASE | Facility: CLINIC | Age: 59
End: 2019-09-10

## 2019-09-10 NOTE — PROGRESS NOTES
INFECTIOUS DISEASE PROGRESS NOTE    Deanna Galeana     3/16/1960 MRN [id number]           PCP Henny Jean Baptiste MD     Antibiotics#35(meropenem#18)  POD # 20/  Subjective:  Doing better in imobilizer, no pain  Objective:  General: aw 7/27  Aspiration 8/3, cx positive, SP I/D 8/6, cx positive  Repeat I/D 8/21, 8/28  -repeat cultures 8/28 no growth to date    CRP improved to 1.3mg /dl on 9/3  Wound clean, soft tissue swelling, no significant effusion  Plan another 2 weeks of ivabx, tisho

## 2019-09-18 ENCOUNTER — OFFICE VISIT (OUTPATIENT)
Dept: PHYSICAL THERAPY | Age: 59
End: 2019-09-18
Attending: ORTHOPAEDIC SURGERY
Payer: COMMERCIAL

## 2019-09-18 PROCEDURE — 97162 PT EVAL MOD COMPLEX 30 MIN: CPT | Performed by: PHYSICAL THERAPIST

## 2019-09-18 PROCEDURE — 97110 THERAPEUTIC EXERCISES: CPT | Performed by: PHYSICAL THERAPIST

## 2019-09-18 NOTE — PROGRESS NOTES
KNEE EVALUATION:    Referring Physician: Brenda Velázquez    DX Code: Pyogenic arthritis of right knee joint, due to unspecified organism ([address] (M00.9)     PT DX: Pyogenic arthritis of right knee joint, due to unspecified organism ([address] (M00.9)    PCP: Becky Lozano organism ([facility] (M00.9) . Pt’s c/c is R knee pain and swelling.        Pt. presents with the following:  Pain Summary:  3-8/10     Postural Summary:  Good alignment     A/PROM Summary:   R knee 5-55 degrees, L knee 0-130 degrres       Strength Summary: 5/5 1-2 personal factors/comorbidities, 3 body structures involved/activity limitations, and unstable symptoms including changing pain levels.     FOTO score: 26/100        Current Medications:   Current Outpatient Medications:  Order #: [id number]   Order #: 29

## 2019-09-21 NOTE — DISCHARGE SUMMARY
[address]    PATIENT'S NAME: Zeyad Zamorano   ATTENDING PHYSICIAN: Esther Randolph M.D.    PATIENT ACCOUNT#:   [de-identified]    LOCATION:  [address] [place] [address]  MEDICAL RECORD #:   [id number]       YOB: 1960  ADMISSION DATE:       08/28/2019

## 2019-09-23 ENCOUNTER — LAB REQUISITION (OUTPATIENT)
Dept: LAB | Facility: HOSPITAL | Age: 59
End: 2019-09-23
Payer: COMMERCIAL

## 2019-09-23 DIAGNOSIS — M00.869: ICD-10-CM

## 2019-09-23 LAB
ANION GAP SERPL CALC-SCNC: 6 MMOL/L (ref 0–18)
BASOPHILS # BLD AUTO: 0.1 X10(3) UL (ref 0–0.2)
BASOPHILS NFR BLD AUTO: 1.2 %
BUN BLD-MCNC: 13 MG/DL (ref 7–18)
BUN/CREAT SERPL: 18.1 (ref 10–20)
CALCIUM BLD-MCNC: 9 MG/DL (ref 8.5–10.1)
CHLORIDE SERPL-SCNC: 105 MMOL/L (ref 98–112)
CO2 SERPL-SCNC: 30 MMOL/L (ref 21–32)
CREAT BLD-MCNC: 0.72 MG/DL (ref 0.7–1.3)
CRP SERPL-MCNC: <0.29 MG/DL (ref ?–0.3)
DEPRECATED RDW RBC AUTO: 41.1 FL (ref 35.1–46.3)
EOSINOPHIL # BLD AUTO: 0.37 X10(3) UL (ref 0–0.7)
EOSINOPHIL NFR BLD AUTO: 4.6 %
ERYTHROCYTE [DISTWIDTH] IN BLOOD BY AUTOMATED COUNT: 12.7 % (ref 11–15)
GLUCOSE BLD-MCNC: 99 MG/DL (ref 70–99)
HCT VFR BLD AUTO: 40 % (ref 39–53)
HGB BLD-MCNC: 13 G/DL (ref 13–17.5)
IMM GRANULOCYTES # BLD AUTO: 0.06 X10(3) UL (ref 0–1)
IMM GRANULOCYTES NFR BLD: 0.7 %
LYMPHOCYTES # BLD AUTO: 2.2 X10(3) UL (ref 1–4)
LYMPHOCYTES NFR BLD AUTO: 27.4 %
MCH RBC QN AUTO: 28.7 PG (ref 26–34)
MCHC RBC AUTO-ENTMCNC: 32.5 G/DL (ref 31–37)
MCV RBC AUTO: 88.3 FL (ref 80–100)
MONOCYTES # BLD AUTO: 0.56 X10(3) UL (ref 0.1–1)
MONOCYTES NFR BLD AUTO: 7 %
NEUTROPHILS # BLD AUTO: 4.75 X10 (3) UL (ref 1.5–7.7)
NEUTROPHILS # BLD AUTO: 4.75 X10(3) UL (ref 1.5–7.7)
NEUTROPHILS NFR BLD AUTO: 59.1 %
OSMOLALITY SERPL CALC.SUM OF ELEC: 292 MOSM/KG (ref 275–295)
PLATELET # BLD AUTO: 275 10(3)UL (ref 150–450)
POTASSIUM SERPL-SCNC: 4 MMOL/L (ref 3.5–5.1)
RBC # BLD AUTO: 4.53 X10(6)UL (ref 4.3–5.7)
SODIUM SERPL-SCNC: 141 MMOL/L (ref 136–145)
WBC # BLD AUTO: 8 X10(3) UL (ref 4–11)

## 2019-09-23 PROCEDURE — 80048 BASIC METABOLIC PNL TOTAL CA: CPT | Performed by: INTERNAL MEDICINE

## 2019-09-23 PROCEDURE — 85025 COMPLETE CBC W/AUTO DIFF WBC: CPT | Performed by: INTERNAL MEDICINE

## 2019-09-23 PROCEDURE — 86140 C-REACTIVE PROTEIN: CPT | Performed by: INTERNAL MEDICINE

## 2019-09-24 ENCOUNTER — OFFICE VISIT (OUTPATIENT)
Dept: PHYSICAL THERAPY | Age: 59
End: 2019-09-24
Attending: ORTHOPAEDIC SURGERY
Payer: COMMERCIAL

## 2019-09-24 PROCEDURE — 97110 THERAPEUTIC EXERCISES: CPT | Performed by: PHYSICAL THERAPIST

## 2019-09-24 NOTE — PROGRESS NOTES
Dx: Pyogenic arthritis of right knee joint, due to unspecified organism ([facility] (M00.9)             Authorized # of Visits:  8         Next MD visit: none scheduled  Fall Risk: standard         Precautions: n/a           Medication Changes since last visit?:

## 2019-10-01 ENCOUNTER — OFFICE VISIT (OUTPATIENT)
Dept: PHYSICAL THERAPY | Age: 59
End: 2019-10-01
Attending: ORTHOPAEDIC SURGERY
Payer: COMMERCIAL

## 2019-10-01 PROCEDURE — 97110 THERAPEUTIC EXERCISES: CPT | Performed by: PHYSICAL THERAPIST

## 2019-10-01 NOTE — PROGRESS NOTES
Dx: Pyogenic arthritis of right knee joint, due to unspecified organism [facility]) (M00.9)             Authorized # of Visits:  8         Next MD visit: 10/9/19  Fall Risk: standard         Precautions: n/a           Medication Changes since last visit?: No

## 2019-10-03 ENCOUNTER — OFFICE VISIT (OUTPATIENT)
Dept: PHYSICAL THERAPY | Age: 59
End: 2019-10-03
Attending: ORTHOPAEDIC SURGERY
Payer: COMMERCIAL

## 2019-10-03 PROCEDURE — 97110 THERAPEUTIC EXERCISES: CPT | Performed by: PHYSICAL THERAPIST

## 2019-10-03 NOTE — PROGRESS NOTES
Dx: Pyogenic arthritis of right knee joint, due to unspecified organism [facility]) (M00.9)             Authorized # of Visits:  8         Next MD visit: 10/9/19  Fall Risk: standard         Precautions: n/a           Medication Changes since last visit?: No days, then gradually increase.     Skilled Services: TE, pt ed    Charges: TE3       Total Timed Treatment: 44 min  Total Treatment Time: 46 min

## 2019-10-08 ENCOUNTER — OFFICE VISIT (OUTPATIENT)
Dept: PHYSICAL THERAPY | Age: 59
End: 2019-10-08
Attending: ORTHOPAEDIC SURGERY
Payer: COMMERCIAL

## 2019-10-08 PROCEDURE — 97110 THERAPEUTIC EXERCISES: CPT | Performed by: PHYSICAL THERAPIST

## 2019-10-10 ENCOUNTER — OFFICE VISIT (OUTPATIENT)
Dept: PHYSICAL THERAPY | Age: 59
End: 2019-10-10
Attending: ORTHOPAEDIC SURGERY
Payer: COMMERCIAL

## 2019-10-10 PROCEDURE — 97110 THERAPEUTIC EXERCISES: CPT | Performed by: PHYSICAL THERAPIST

## 2019-10-10 NOTE — PROGRESS NOTES
Dx: Pyogenic arthritis of right knee joint, due to unspecified organism [facility]) (M00.9)             Authorized # of Visits:  8         Next MD visit: 10/9/19  Fall Risk: standard         Precautions: n/a           Medication Changes since last visit?: No heel raise 4x5 ea Stand unilat heel raise 4x10 ea Stand unilat heel raise 2x20 ea Stand unilat heel raise 2x20 ea                           Assessment: decent control with walking, needs rail for safe stair negotiation.  R calf and thigh getting stronger, s

## 2019-10-15 ENCOUNTER — OFFICE VISIT (OUTPATIENT)
Dept: PHYSICAL THERAPY | Age: 59
End: 2019-10-15
Attending: ORTHOPAEDIC SURGERY
Payer: COMMERCIAL

## 2019-10-15 PROCEDURE — 97110 THERAPEUTIC EXERCISES: CPT | Performed by: PHYSICAL THERAPIST

## 2019-10-15 NOTE — PROGRESS NOTES
Dx: Pyogenic arthritis of right knee joint, due to unspecified organism [facility]) (M00.9)             Authorized # of Visits:  8         Next MD visit: 11/20/19  Fall Risk: standard         Precautions: n/a           Medication Changes since last visit?: No sw ball x 30     Stand hip abd 2x10 ea Stand hip abd 2x10 ea Stand hip abd 2x10 ea Stand hip abd 2x10 ea Stand hip abd 2x10 ea     Stand unilat heel raise 4x5 ea Stand unilat heel raise 4x10 ea Stand unilat heel raise 2x20 ea Stand unilat heel raise 2x20 e

## 2019-10-21 ENCOUNTER — PATIENT MESSAGE (OUTPATIENT)
Dept: FAMILY MEDICINE CLINIC | Facility: CLINIC | Age: 59
End: 2019-10-21

## 2019-10-22 ENCOUNTER — APPOINTMENT (OUTPATIENT)
Dept: ULTRASOUND IMAGING | Age: 59
End: 2019-10-22
Attending: EMERGENCY MEDICINE
Payer: COMMERCIAL

## 2019-10-22 ENCOUNTER — HOSPITAL ENCOUNTER (OUTPATIENT)
Age: 59
Discharge: HOME | End: 2019-10-22
Attending: EMERGENCY MEDICINE
Payer: COMMERCIAL

## 2019-10-22 ENCOUNTER — TELEPHONE (OUTPATIENT)
Dept: FAMILY MEDICINE CLINIC | Facility: CLINIC | Age: 59
End: 2019-10-22

## 2019-10-22 VITALS
DIASTOLIC BLOOD PRESSURE: 73 MMHG | OXYGEN SATURATION: 99 % | TEMPERATURE: 98 F | SYSTOLIC BLOOD PRESSURE: 125 MMHG | HEART RATE: 88 BPM | RESPIRATION RATE: 18 BRPM

## 2019-10-22 DIAGNOSIS — M79.2 NEURALGIA: Primary | ICD-10-CM

## 2019-10-22 PROCEDURE — 93971 EXTREMITY STUDY: CPT | Performed by: EMERGENCY MEDICINE

## 2019-10-22 PROCEDURE — 99214 OFFICE O/P EST MOD 30 MIN: CPT

## 2019-10-22 RX ORDER — TRAMADOL HYDROCHLORIDE 50 MG/1
TABLET ORAL EVERY 6 HOURS PRN
Qty: 10 TABLET | Refills: 0 | Status: SHIPPED | OUTPATIENT
Start: 2019-10-22 | End: 2021-01-13

## 2019-10-22 NOTE — TELEPHONE ENCOUNTER
Call to pt-confirms info noted below re PICC line complaints while PICC line was in and same complaints unchanged since PICC line discontinued 3 wks ago. Reports PICC line was in right upper arm-describes site as \"approx 2 \" above crease in elbow\".  Co

## 2019-10-22 NOTE — ED PROVIDER NOTES
Patient Seen in: [facility]      History   Patient presents with:  Arm Pain    Stated Complaint: arm pain x 3 weeks    HPI    Patient is a 63-year-old right-hand-dominant male who presents emergency room with a history of some d Smoking status: Former Smoker        Packs/day: 0.25        Years: 20.00        Pack years: 5        Types: Cigarettes      Smokeless tobacco: Former User        Quit date: 7/27/2019    Alcohol use: No      Frequency: Never      Binge frequency: Never    D back in breathing easily in no apparent distress. Patient be treated with pain medication for home. It is possible the patient has some nerve irritation in the location of where the patient has previous PICC line placement.   There is no evidence of any i

## 2019-10-22 NOTE — ED INITIAL ASSESSMENT (HPI)
Complains of right upper arm pain for the last three weeks. States had PICC line on it for seven weeks. PICC line was removed on 10/2/19.

## 2019-10-22 NOTE — TELEPHONE ENCOUNTER
Patient sent this message through [address] yesterday. Wife has called and would like to speak with a Nurse. States he is very uncomfortable. Had PICC line removed after infection.  Arm was very sore during treatments and now 3 weeks after picc line re

## 2019-10-23 ENCOUNTER — OFFICE VISIT (OUTPATIENT)
Dept: PHYSICAL THERAPY | Age: 59
End: 2019-10-23
Attending: ORTHOPAEDIC SURGERY
Payer: COMMERCIAL

## 2019-10-23 PROCEDURE — 97110 THERAPEUTIC EXERCISES: CPT | Performed by: PHYSICAL THERAPIST

## 2019-10-23 NOTE — PROGRESS NOTES
Dx: Pyogenic arthritis of right knee joint, due to unspecified organism [facility]) (M00.9)             Authorized # of Visits:  8         Next MD visit: 11/20/19  Fall Risk: standard         Precautions: n/a           Medication Changes since last visit?: No bridge sw ball x 30 Supine bridge sw ball x 30     Stand hip abd 2x10 ea Stand hip abd 2x10 ea Stand hip abd 2x10 ea Stand hip abd 2x10 ea Stand hip abd 2x10 ea Gait variations: marching, step length, \"skating\", sidestepping, partial lunges x 12 min    S

## 2021-01-15 ENCOUNTER — LAB ENCOUNTER (OUTPATIENT)
Dept: LAB | Facility: HOSPITAL | Age: 61
End: 2021-01-15
Attending: INTERNAL MEDICINE
Payer: COMMERCIAL

## 2021-01-15 DIAGNOSIS — K52.9 CHRONIC DIARRHEA: ICD-10-CM

## 2021-01-15 LAB — C DIFF TOX B STL QL: POSITIVE

## 2021-01-15 PROCEDURE — 87427 SHIGA-LIKE TOXIN AG IA: CPT

## 2021-01-15 PROCEDURE — 87045 FECES CULTURE AEROBIC BACT: CPT

## 2021-01-15 PROCEDURE — 87046 STOOL CULTR AEROBIC BACT EA: CPT

## 2021-01-15 PROCEDURE — 87493 C DIFF AMPLIFIED PROBE: CPT

## 2021-03-20 ENCOUNTER — LAB ENCOUNTER (OUTPATIENT)
Dept: LAB | Facility: HOSPITAL | Age: 61
End: 2021-03-20
Attending: INTERNAL MEDICINE
Payer: COMMERCIAL

## 2021-03-20 DIAGNOSIS — R19.7 DIARRHEA, UNSPECIFIED TYPE: ICD-10-CM

## 2021-03-20 DIAGNOSIS — Z86.19 HISTORY OF CLOSTRIDIUM DIFFICILE INFECTION: ICD-10-CM

## 2021-03-20 PROCEDURE — 87493 C DIFF AMPLIFIED PROBE: CPT

## 2021-03-21 LAB — C DIFF TOX B STL QL: NEGATIVE

## 2021-03-25 ENCOUNTER — TELEPHONE (OUTPATIENT)
Dept: FAMILY MEDICINE CLINIC | Facility: CLINIC | Age: 61
End: 2021-03-25

## 2021-03-25 NOTE — TELEPHONE ENCOUNTER
1. What are your symptoms? Abdominal Pain cramping    2. How long have you been having these symptoms? Worse since weekend    3. Have you done anything already to treat your symptoms?          ADDITIONAL INFO:     Patient states he was diagnosed with

## 2021-03-25 NOTE — TELEPHONE ENCOUNTER
Yes since GI was already informed about patient's condition and ordered C- diff  I would like him to follow-up with GI doctor. Thanks.

## 2021-03-25 NOTE — TELEPHONE ENCOUNTER
C/o abdl cramping     On and off abdominal cramping since for 6 mos (since dx with c diff)   Significant episode this weekend after eating cake and ice cream    Location-diffuse, \"all over\"    Characteristics-crampy    Hx of c diff   Had negative c diff

## 2021-04-05 ENCOUNTER — LAB ENCOUNTER (OUTPATIENT)
Dept: LAB | Age: 61
End: 2021-04-05
Attending: INTERNAL MEDICINE
Payer: COMMERCIAL

## 2021-04-05 DIAGNOSIS — Z01.818 PRE-OP TESTING: ICD-10-CM

## 2021-04-08 PROBLEM — R19.7 DIARRHEA: Status: ACTIVE | Noted: 2021-04-08

## 2021-04-08 PROBLEM — D12.2 BENIGN NEOPLASM OF ASCENDING COLON: Status: ACTIVE | Noted: 2021-04-08

## 2021-05-07 NOTE — PAYOR COMM NOTE
--------------  DISCHARGE REVIEW    Payor: [address] PPO  Subscriber #:  [id number]  Authorization Number: [id number]    Admit date: 8/21/19  Admit time:  1255  Discharge Date: 8/24/2019  7:58 PM     Admitting Physician: MD Alex Hinkle Statement Selected

## 2021-06-07 ENCOUNTER — OFFICE VISIT (OUTPATIENT)
Dept: FAMILY MEDICINE CLINIC | Facility: CLINIC | Age: 61
End: 2021-06-07
Payer: COMMERCIAL

## 2021-06-07 VITALS
BODY MASS INDEX: 23.06 KG/M2 | SYSTOLIC BLOOD PRESSURE: 100 MMHG | RESPIRATION RATE: 20 BRPM | DIASTOLIC BLOOD PRESSURE: 58 MMHG | HEART RATE: 56 BPM | TEMPERATURE: 98 F | HEIGHT: 73 IN | WEIGHT: 174 LBS

## 2021-06-07 DIAGNOSIS — Z00.00 LABORATORY TESTS ORDERED AS PART OF A COMPLETE PHYSICAL EXAM (CPE): ICD-10-CM

## 2021-06-07 DIAGNOSIS — R94.6 ABNORMAL FINDING ON EXAMINATION OF THYROID GLAND: ICD-10-CM

## 2021-06-07 DIAGNOSIS — Z00.00 PHYSICAL EXAM, ANNUAL: Primary | ICD-10-CM

## 2021-06-07 DIAGNOSIS — N52.9 ERECTILE DYSFUNCTION, UNSPECIFIED ERECTILE DYSFUNCTION TYPE: ICD-10-CM

## 2021-06-07 PROCEDURE — 3008F BODY MASS INDEX DOCD: CPT | Performed by: FAMILY MEDICINE

## 2021-06-07 PROCEDURE — 99396 PREV VISIT EST AGE 40-64: CPT | Performed by: FAMILY MEDICINE

## 2021-06-07 PROCEDURE — 3078F DIAST BP <80 MM HG: CPT | Performed by: FAMILY MEDICINE

## 2021-06-07 PROCEDURE — 3074F SYST BP LT 130 MM HG: CPT | Performed by: FAMILY MEDICINE

## 2021-06-07 RX ORDER — GARLIC EXTRACT 500 MG
1 CAPSULE ORAL DAILY
COMMUNITY

## 2021-06-07 NOTE — PROGRESS NOTES
Rosi Estrada is a 64year old male who presents for a complete physical exam.   HPI:   Pt complains of of having erectile dysfunction had infection of the knee last year treated with strong antibiotics right after starting antibiotic noticed that he ha 11/08/2013 0.881     No results found for: GLUCOSE    Current Outpatient Medications   Medication Sig Dispense Refill   • Acidophilus/Pectin Oral Cap Take 1 capsule by mouth daily.         Past Medical History:   Diagnosis Date   • Abdominal pain 08/15/22 Vaping Use: Never used    Alcohol use: No    Drug use: No      : yes. Children: yes. Exercise: walking.   Diet: watches calories closely     REVIEW OF SYSTEMS:   GENERAL: feels well otherwise  SKIN: denies any unusual skin lesions  EYES:denies b (cpe)  Abnormal finding on examination of thyroid gland  Erectile dysfunction, unspecified erectile dysfunction type    Orders Placed This Encounter      CBC With Differential With Platelet      Comp Metabolic Panel (14)      Lipid Panel      TSH W Reflex

## 2021-06-07 NOTE — PATIENT INSTRUCTIONS
Consider getting shingles vaccination - Shingrix  Check with covered with insurance. Healthy diet. Stay active. Call [phone] to schedule   Ultrasound of the thyroid and fasting blood work.

## 2021-06-14 ENCOUNTER — LAB ENCOUNTER (OUTPATIENT)
Dept: LAB | Age: 61
End: 2021-06-14
Attending: FAMILY MEDICINE
Payer: COMMERCIAL

## 2021-06-14 DIAGNOSIS — Z00.00 LABORATORY TESTS ORDERED AS PART OF A COMPLETE PHYSICAL EXAM (CPE): ICD-10-CM

## 2021-06-14 DIAGNOSIS — N52.9 ERECTILE DYSFUNCTION, UNSPECIFIED ERECTILE DYSFUNCTION TYPE: ICD-10-CM

## 2021-06-14 PROCEDURE — 84403 ASSAY OF TOTAL TESTOSTERONE: CPT

## 2021-06-14 PROCEDURE — 80053 COMPREHEN METABOLIC PANEL: CPT

## 2021-06-14 PROCEDURE — 36415 COLL VENOUS BLD VENIPUNCTURE: CPT

## 2021-06-14 PROCEDURE — 84443 ASSAY THYROID STIM HORMONE: CPT

## 2021-06-14 PROCEDURE — 81001 URINALYSIS AUTO W/SCOPE: CPT

## 2021-06-14 PROCEDURE — 85025 COMPLETE CBC W/AUTO DIFF WBC: CPT

## 2021-06-14 PROCEDURE — 84402 ASSAY OF FREE TESTOSTERONE: CPT

## 2021-06-14 PROCEDURE — 80061 LIPID PANEL: CPT

## 2021-06-14 PROCEDURE — 84439 ASSAY OF FREE THYROXINE: CPT

## 2021-06-17 DIAGNOSIS — R79.89 ABNORMAL TSH: Primary | ICD-10-CM

## 2021-06-27 ENCOUNTER — HOSPITAL ENCOUNTER (OUTPATIENT)
Dept: ULTRASOUND IMAGING | Age: 61
Discharge: HOME | End: 2021-06-27
Attending: FAMILY MEDICINE
Payer: COMMERCIAL

## 2021-06-27 DIAGNOSIS — R94.6 ABNORMAL FINDING ON EXAMINATION OF THYROID GLAND: ICD-10-CM

## 2021-06-27 PROCEDURE — 76536 US EXAM OF HEAD AND NECK: CPT | Performed by: FAMILY MEDICINE

## 2021-06-28 ENCOUNTER — PATIENT MESSAGE (OUTPATIENT)
Dept: FAMILY MEDICINE CLINIC | Facility: CLINIC | Age: 61
End: 2021-06-28

## 2021-06-30 NOTE — TELEPHONE ENCOUNTER
From: Todd Alejandro  To: Cassia Miller MD  Sent: 6/28/2021 8:02 AM CDT  Subject: Non-Urgent Tianna Velasco how are you? We already got Prince's test results for the ultrasound on his thyroid. I guess he has thyroiditis?  I think

## 2021-12-02 ENCOUNTER — PATIENT MESSAGE (OUTPATIENT)
Dept: FAMILY MEDICINE CLINIC | Facility: CLINIC | Age: 61
End: 2021-12-02

## 2021-12-03 NOTE — TELEPHONE ENCOUNTER
From: Alyse Rubin  To: Shantelle Morris MD  Sent: 12/2/2021 7:31 AM CST  Subject: Booster Shot for Brad Jean Baptiste how do I get an appointment to get a booster shot. I got the Moderna vaccine. Can I get the Pfizer booster shot?

## 2021-12-20 ENCOUNTER — PATIENT MESSAGE (OUTPATIENT)
Dept: FAMILY MEDICINE CLINIC | Facility: CLINIC | Age: 61
End: 2021-12-20

## 2021-12-21 NOTE — TELEPHONE ENCOUNTER
From: Addy Sharma  To: Esa Puri MD  Sent: 12/20/2021 9:10 PM CST  Subject: Jose A Pina here is an updated vaccine for [facility]

## 2022-05-15 ENCOUNTER — PATIENT MESSAGE (OUTPATIENT)
Dept: FAMILY MEDICINE CLINIC | Facility: CLINIC | Age: 62
End: 2022-05-15

## 2022-05-16 NOTE — TELEPHONE ENCOUNTER
From: Anette Boeck  To: Alex Carlson MD  Sent: 5/15/2022 1:17 PM CDT  Subject: Fermin Cottrell needs bloodwork    Hi Dr. Jeri Daniels how are you? This is YordangJurgen needs to come to see you with his year physical. Could you please sent bloodwork to your lab. He needs Cholesterol, Thyroid and whatever else he had last year. Also he needs to have his prostate checked out. He needs the PSA blood test for his prostate. Thank you!   Peggy Casey

## 2022-05-18 ENCOUNTER — LAB ENCOUNTER (OUTPATIENT)
Dept: LAB | Age: 62
End: 2022-05-18
Attending: FAMILY MEDICINE
Payer: COMMERCIAL

## 2022-05-18 DIAGNOSIS — Z13.89 SCREENING FOR GENITOURINARY CONDITION: ICD-10-CM

## 2022-05-18 DIAGNOSIS — Z12.5 SCREENING FOR MALIGNANT NEOPLASM OF PROSTATE: ICD-10-CM

## 2022-05-18 DIAGNOSIS — Z00.00 LABORATORY EXAM ORDERED AS PART OF ROUTINE GENERAL MEDICAL EXAMINATION: ICD-10-CM

## 2022-05-18 LAB
ALBUMIN SERPL-MCNC: 4.1 G/DL (ref 3.4–5)
ALBUMIN/GLOB SERPL: 1.3 {RATIO} (ref 1–2)
ALP LIVER SERPL-CCNC: 85 U/L
ALT SERPL-CCNC: 29 U/L
ANION GAP SERPL CALC-SCNC: 6 MMOL/L (ref 0–18)
AST SERPL-CCNC: 21 U/L (ref 15–37)
BASOPHILS # BLD AUTO: 0.1 X10(3) UL (ref 0–0.2)
BASOPHILS NFR BLD AUTO: 1.3 %
BILIRUB SERPL-MCNC: 1.1 MG/DL (ref 0.1–2)
BILIRUB UR QL STRIP.AUTO: NEGATIVE
BUN BLD-MCNC: 15 MG/DL (ref 7–18)
CALCIUM BLD-MCNC: 9 MG/DL (ref 8.5–10.1)
CHLORIDE SERPL-SCNC: 108 MMOL/L (ref 98–112)
CHOLEST SERPL-MCNC: 207 MG/DL (ref ?–200)
CLARITY UR REFRACT.AUTO: CLEAR
CO2 SERPL-SCNC: 27 MMOL/L (ref 21–32)
COLOR UR AUTO: YELLOW
CREAT BLD-MCNC: 0.91 MG/DL
EOSINOPHIL # BLD AUTO: 0.25 X10(3) UL (ref 0–0.7)
EOSINOPHIL NFR BLD AUTO: 3.3 %
ERYTHROCYTE [DISTWIDTH] IN BLOOD BY AUTOMATED COUNT: 12.8 %
FASTING PATIENT LIPID ANSWER: YES
FASTING STATUS PATIENT QL REPORTED: YES
GLOBULIN PLAS-MCNC: 3.2 G/DL (ref 2.8–4.4)
GLUCOSE BLD-MCNC: 95 MG/DL (ref 70–99)
GLUCOSE UR STRIP.AUTO-MCNC: NEGATIVE MG/DL
HCT VFR BLD AUTO: 46.6 %
HDLC SERPL-MCNC: 49 MG/DL (ref 40–59)
HGB BLD-MCNC: 15.5 G/DL
IMM GRANULOCYTES # BLD AUTO: 0.03 X10(3) UL (ref 0–1)
IMM GRANULOCYTES NFR BLD: 0.4 %
KETONES UR STRIP.AUTO-MCNC: NEGATIVE MG/DL
LDLC SERPL CALC-MCNC: 147 MG/DL (ref ?–100)
LEUKOCYTE ESTERASE UR QL STRIP.AUTO: NEGATIVE
LYMPHOCYTES # BLD AUTO: 2.56 X10(3) UL (ref 1–4)
LYMPHOCYTES NFR BLD AUTO: 33.6 %
MCH RBC QN AUTO: 29.6 PG (ref 26–34)
MCHC RBC AUTO-ENTMCNC: 33.3 G/DL (ref 31–37)
MCV RBC AUTO: 88.9 FL
MONOCYTES # BLD AUTO: 0.58 X10(3) UL (ref 0.1–1)
MONOCYTES NFR BLD AUTO: 7.6 %
NEUTROPHILS # BLD AUTO: 4.09 X10 (3) UL (ref 1.5–7.7)
NEUTROPHILS # BLD AUTO: 4.09 X10(3) UL (ref 1.5–7.7)
NEUTROPHILS NFR BLD AUTO: 53.8 %
NITRITE UR QL STRIP.AUTO: NEGATIVE
NONHDLC SERPL-MCNC: 158 MG/DL (ref ?–130)
OSMOLALITY SERPL CALC.SUM OF ELEC: 293 MOSM/KG (ref 275–295)
PH UR STRIP.AUTO: 6 [PH] (ref 5–8)
PLATELET # BLD AUTO: 232 10(3)UL (ref 150–450)
POTASSIUM SERPL-SCNC: 4.1 MMOL/L (ref 3.5–5.1)
PROT SERPL-MCNC: 7.3 G/DL (ref 6.4–8.2)
PROT UR STRIP.AUTO-MCNC: NEGATIVE MG/DL
PSA SERPL-MCNC: 1.24 NG/ML (ref ?–4)
RBC # BLD AUTO: 5.24 X10(6)UL
RBC UR QL AUTO: NEGATIVE
SODIUM SERPL-SCNC: 141 MMOL/L (ref 136–145)
SP GR UR STRIP.AUTO: 1.02 (ref 1–1.03)
T4 FREE SERPL-MCNC: 0.9 NG/DL (ref 0.8–1.7)
TRIGL SERPL-MCNC: 64 MG/DL (ref 30–149)
TSI SER-ACNC: 3.75 MIU/ML (ref 0.36–3.74)
UROBILINOGEN UR STRIP.AUTO-MCNC: <2 MG/DL
VLDLC SERPL CALC-MCNC: 12 MG/DL (ref 0–30)
WBC # BLD AUTO: 7.6 X10(3) UL (ref 4–11)

## 2022-05-18 PROCEDURE — 85025 COMPLETE CBC W/AUTO DIFF WBC: CPT

## 2022-05-18 PROCEDURE — 80061 LIPID PANEL: CPT

## 2022-05-18 PROCEDURE — 84439 ASSAY OF FREE THYROXINE: CPT

## 2022-05-18 PROCEDURE — 81003 URINALYSIS AUTO W/O SCOPE: CPT

## 2022-05-18 PROCEDURE — 84443 ASSAY THYROID STIM HORMONE: CPT

## 2022-05-18 PROCEDURE — 80053 COMPREHEN METABOLIC PANEL: CPT

## 2022-05-18 PROCEDURE — 84153 ASSAY OF PSA TOTAL: CPT

## 2022-07-26 ENCOUNTER — PATIENT MESSAGE (OUTPATIENT)
Dept: FAMILY MEDICINE CLINIC | Facility: CLINIC | Age: 62
End: 2022-07-26

## 2022-07-26 NOTE — TELEPHONE ENCOUNTER
From: Latrice Ash  To: Nas Gentile MD  Sent: 7/26/2022 10:00 AM CDT  Subject: Joi Banda how are you? Could you give me the CPT code for CT Calcium Scoring? I want to call [facility].     Thank you,  Jo Adan

## 2022-08-04 ENCOUNTER — HOSPITAL ENCOUNTER (OUTPATIENT)
Dept: CT IMAGING | Age: 62
Discharge: HOME | End: 2022-08-04
Attending: FAMILY MEDICINE

## 2022-08-04 VITALS — WEIGHT: 174 LBS | HEIGHT: 73 IN | BODY MASS INDEX: 23.06 KG/M2

## 2022-08-04 DIAGNOSIS — Z13.6 SCREENING FOR CARDIOVASCULAR CONDITION: ICD-10-CM

## 2022-08-04 LAB
POCT GLUCOSE CHOLESTECH: 86 (ref 70–99)
POCT HDL: 40 (ref 40–60)
POCT LDL: 167 (ref 0–99)
POCT TOTAL CHOLESTEROL: 226 (ref 110–200)
POCT TRIGLYCERIDES: 93 (ref 1–149)

## 2022-08-09 DIAGNOSIS — K76.9 LIVER LESION: Primary | ICD-10-CM

## 2022-08-11 DIAGNOSIS — E78.00 PURE HYPERCHOLESTEROLEMIA: Primary | ICD-10-CM

## 2022-08-15 ENCOUNTER — LAB ENCOUNTER (OUTPATIENT)
Dept: LAB | Age: 62
End: 2022-08-15
Attending: FAMILY MEDICINE
Payer: COMMERCIAL

## 2022-08-15 DIAGNOSIS — E78.00 PURE HYPERCHOLESTEROLEMIA: ICD-10-CM

## 2022-08-15 LAB
ALBUMIN SERPL-MCNC: 4.1 G/DL (ref 3.4–5)
ALBUMIN/GLOB SERPL: 1.2 {RATIO} (ref 1–2)
ALP LIVER SERPL-CCNC: 89 U/L
ALT SERPL-CCNC: 34 U/L
ANION GAP SERPL CALC-SCNC: 8 MMOL/L (ref 0–18)
AST SERPL-CCNC: 25 U/L (ref 15–37)
BILIRUB SERPL-MCNC: 2.3 MG/DL (ref 0.1–2)
BUN BLD-MCNC: 16 MG/DL (ref 7–18)
BUN/CREAT SERPL: 18.6 (ref 10–20)
CALCIUM BLD-MCNC: 9.3 MG/DL (ref 8.5–10.1)
CHLORIDE SERPL-SCNC: 107 MMOL/L (ref 98–112)
CHOLEST SERPL-MCNC: 215 MG/DL (ref ?–200)
CO2 SERPL-SCNC: 25 MMOL/L (ref 21–32)
CREAT BLD-MCNC: 0.86 MG/DL
FASTING PATIENT LIPID ANSWER: YES
FASTING STATUS PATIENT QL REPORTED: YES
GFR SERPLBLD BASED ON 1.73 SQ M-ARVRAT: 98 ML/MIN/1.73M2 (ref 60–?)
GLOBULIN PLAS-MCNC: 3.4 G/DL (ref 2.8–4.4)
GLUCOSE BLD-MCNC: 85 MG/DL (ref 70–99)
HDLC SERPL-MCNC: 56 MG/DL (ref 40–59)
LDLC SERPL CALC-MCNC: 149 MG/DL (ref ?–100)
NONHDLC SERPL-MCNC: 159 MG/DL (ref ?–130)
OSMOLALITY SERPL CALC.SUM OF ELEC: 290 MOSM/KG (ref 275–295)
POTASSIUM SERPL-SCNC: 4.1 MMOL/L (ref 3.5–5.1)
PROT SERPL-MCNC: 7.5 G/DL (ref 6.4–8.2)
SODIUM SERPL-SCNC: 140 MMOL/L (ref 136–145)
TRIGL SERPL-MCNC: 55 MG/DL (ref 30–149)
VLDLC SERPL CALC-MCNC: 10 MG/DL (ref 0–30)

## 2022-08-15 PROCEDURE — 80061 LIPID PANEL: CPT

## 2022-08-15 PROCEDURE — 80053 COMPREHEN METABOLIC PANEL: CPT

## 2022-08-17 ENCOUNTER — HOSPITAL ENCOUNTER (OUTPATIENT)
Dept: ULTRASOUND IMAGING | Age: 62
Discharge: HOME | End: 2022-08-17
Attending: FAMILY MEDICINE
Payer: COMMERCIAL

## 2022-08-17 DIAGNOSIS — K76.9 LIVER LESION: ICD-10-CM

## 2022-08-17 PROCEDURE — 76700 US EXAM ABDOM COMPLETE: CPT | Performed by: FAMILY MEDICINE

## 2022-08-23 ENCOUNTER — OFFICE VISIT (OUTPATIENT)
Dept: FAMILY MEDICINE CLINIC | Facility: CLINIC | Age: 62
End: 2022-08-23
Payer: COMMERCIAL

## 2022-08-23 VITALS
DIASTOLIC BLOOD PRESSURE: 66 MMHG | HEIGHT: 73 IN | SYSTOLIC BLOOD PRESSURE: 102 MMHG | WEIGHT: 186 LBS | RESPIRATION RATE: 14 BRPM | HEART RATE: 84 BPM | BODY MASS INDEX: 24.65 KG/M2 | TEMPERATURE: 99 F

## 2022-08-23 DIAGNOSIS — Z00.00 PHYSICAL EXAM, ANNUAL: Primary | ICD-10-CM

## 2022-08-23 PROCEDURE — 3008F BODY MASS INDEX DOCD: CPT | Performed by: FAMILY MEDICINE

## 2022-08-23 PROCEDURE — 3078F DIAST BP <80 MM HG: CPT | Performed by: FAMILY MEDICINE

## 2022-08-23 PROCEDURE — 3074F SYST BP LT 130 MM HG: CPT | Performed by: FAMILY MEDICINE

## 2022-08-23 PROCEDURE — 99396 PREV VISIT EST AGE 40-64: CPT | Performed by: FAMILY MEDICINE

## 2022-08-24 ENCOUNTER — OFFICE VISIT (OUTPATIENT)
Dept: FAMILY MEDICINE CLINIC | Facility: CLINIC | Age: 62
End: 2022-08-24
Payer: COMMERCIAL

## 2022-08-24 ENCOUNTER — TELEPHONE (OUTPATIENT)
Dept: FAMILY MEDICINE CLINIC | Facility: CLINIC | Age: 62
End: 2022-08-24

## 2022-08-24 VITALS
HEART RATE: 66 BPM | HEIGHT: 73 IN | TEMPERATURE: 98 F | BODY MASS INDEX: 24.78 KG/M2 | DIASTOLIC BLOOD PRESSURE: 60 MMHG | WEIGHT: 187 LBS | SYSTOLIC BLOOD PRESSURE: 100 MMHG | RESPIRATION RATE: 16 BRPM

## 2022-08-24 DIAGNOSIS — E78.00 HYPERCHOLESTEROLEMIA: ICD-10-CM

## 2022-08-24 DIAGNOSIS — R00.1 BRADYCARDIA: ICD-10-CM

## 2022-08-24 DIAGNOSIS — R79.89 ELEVATED TSH: ICD-10-CM

## 2022-08-24 DIAGNOSIS — K76.89 LIVER CYST: ICD-10-CM

## 2022-08-24 DIAGNOSIS — R93.1 ABNORMAL CT SCAN OF HEART: Primary | ICD-10-CM

## 2022-08-24 DIAGNOSIS — J47.9 BRONCHIECTASIS WITHOUT COMPLICATION (HCC): ICD-10-CM

## 2022-08-24 DIAGNOSIS — I25.10 ATHEROSCLEROSIS OF NATIVE CORONARY ARTERY OF NATIVE HEART WITHOUT ANGINA PECTORIS: ICD-10-CM

## 2022-08-24 PROCEDURE — 99214 OFFICE O/P EST MOD 30 MIN: CPT | Performed by: FAMILY MEDICINE

## 2022-08-24 PROCEDURE — 93000 ELECTROCARDIOGRAM COMPLETE: CPT | Performed by: FAMILY MEDICINE

## 2022-08-24 PROCEDURE — 3074F SYST BP LT 130 MM HG: CPT | Performed by: FAMILY MEDICINE

## 2022-08-24 PROCEDURE — 3078F DIAST BP <80 MM HG: CPT | Performed by: FAMILY MEDICINE

## 2022-08-24 PROCEDURE — 3008F BODY MASS INDEX DOCD: CPT | Performed by: FAMILY MEDICINE

## 2022-08-24 RX ORDER — ROSUVASTATIN CALCIUM 5 MG/1
5 TABLET, COATED ORAL NIGHTLY
Qty: 30 TABLET | Refills: 2 | Status: SHIPPED | OUTPATIENT
Start: 2022-08-24

## 2022-08-24 NOTE — PATIENT INSTRUCTIONS
Call [phone] to schedule stress test.  Start rosuvastatin 5 mg 1 tablet daily in the morning. Start aspirin 81 mg enteric-coated once a day. Low-fat diet as we discussed in the office. Do fasting blood work prior visit in 2 months. Healthy diet. Stay active.

## 2022-08-24 NOTE — TELEPHONE ENCOUNTER
Nurse visit appt tomorrow for shingrix. Please see pended order and approve if appropriate. Thank you.

## 2022-08-25 ENCOUNTER — NURSE ONLY (OUTPATIENT)
Dept: FAMILY MEDICINE CLINIC | Facility: CLINIC | Age: 62
End: 2022-08-25
Payer: COMMERCIAL

## 2022-08-25 PROCEDURE — 90750 HZV VACC RECOMBINANT IM: CPT | Performed by: FAMILY MEDICINE

## 2022-08-25 PROCEDURE — 90471 IMMUNIZATION ADMIN: CPT | Performed by: FAMILY MEDICINE

## 2022-10-18 ENCOUNTER — NURSE ONLY (OUTPATIENT)
Dept: FAMILY MEDICINE CLINIC | Facility: CLINIC | Age: 62
End: 2022-10-18
Payer: COMMERCIAL

## 2022-10-18 NOTE — PROGRESS NOTES
Pt came in for 2 nd shingrix vaccine, but too early. Advised to have the second one 2-6 months after the first.  Last one done on 08/25/2022. Pt voiced understanding and will call back to re schedule.

## 2022-11-21 ENCOUNTER — IMMUNIZATION (OUTPATIENT)
Dept: LAB | Age: 62
End: 2022-11-21
Attending: EMERGENCY MEDICINE
Payer: COMMERCIAL

## 2022-11-21 ENCOUNTER — LAB ENCOUNTER (OUTPATIENT)
Dept: LAB | Age: 62
End: 2022-11-21
Attending: FAMILY MEDICINE
Payer: COMMERCIAL

## 2022-11-21 ENCOUNTER — PATIENT MESSAGE (OUTPATIENT)
Dept: FAMILY MEDICINE CLINIC | Facility: CLINIC | Age: 62
End: 2022-11-21

## 2022-11-21 DIAGNOSIS — R79.89 ELEVATED TSH: ICD-10-CM

## 2022-11-21 DIAGNOSIS — I25.10 ATHEROSCLEROSIS OF NATIVE CORONARY ARTERY OF NATIVE HEART WITHOUT ANGINA PECTORIS: ICD-10-CM

## 2022-11-21 DIAGNOSIS — R93.1 ABNORMAL CT SCAN OF HEART: ICD-10-CM

## 2022-11-21 DIAGNOSIS — E78.00 HYPERCHOLESTEROLEMIA: ICD-10-CM

## 2022-11-21 DIAGNOSIS — Z23 NEED FOR VACCINATION: Primary | ICD-10-CM

## 2022-11-21 LAB
ALBUMIN SERPL-MCNC: 4.1 G/DL (ref 3.4–5)
ALBUMIN/GLOB SERPL: 1.4 {RATIO} (ref 1–2)
ALP LIVER SERPL-CCNC: 89 U/L
ALT SERPL-CCNC: 31 U/L
ANION GAP SERPL CALC-SCNC: 4 MMOL/L (ref 0–18)
AST SERPL-CCNC: 21 U/L (ref 15–37)
BILIRUB SERPL-MCNC: 1.1 MG/DL (ref 0.1–2)
BUN BLD-MCNC: 11 MG/DL (ref 7–18)
BUN/CREAT SERPL: 13.6 (ref 10–20)
CALCIUM BLD-MCNC: 9 MG/DL (ref 8.5–10.1)
CHLORIDE SERPL-SCNC: 108 MMOL/L (ref 98–112)
CHOLEST SERPL-MCNC: 143 MG/DL (ref ?–200)
CO2 SERPL-SCNC: 28 MMOL/L (ref 21–32)
CREAT BLD-MCNC: 0.81 MG/DL
FASTING PATIENT LIPID ANSWER: YES
FASTING STATUS PATIENT QL REPORTED: YES
GFR SERPLBLD BASED ON 1.73 SQ M-ARVRAT: 100 ML/MIN/1.73M2 (ref 60–?)
GLOBULIN PLAS-MCNC: 2.9 G/DL (ref 2.8–4.4)
GLUCOSE BLD-MCNC: 94 MG/DL (ref 70–99)
HDLC SERPL-MCNC: 55 MG/DL (ref 40–59)
LDLC SERPL CALC-MCNC: 76 MG/DL (ref ?–100)
NONHDLC SERPL-MCNC: 88 MG/DL (ref ?–130)
OSMOLALITY SERPL CALC.SUM OF ELEC: 289 MOSM/KG (ref 275–295)
POTASSIUM SERPL-SCNC: 4.5 MMOL/L (ref 3.5–5.1)
PROT SERPL-MCNC: 7 G/DL (ref 6.4–8.2)
SODIUM SERPL-SCNC: 140 MMOL/L (ref 136–145)
T3FREE SERPL-MCNC: 2.72 PG/ML (ref 2.4–4.2)
T4 FREE SERPL-MCNC: 0.8 NG/DL (ref 0.8–1.7)
THYROGLOB SERPL-MCNC: >500 U/ML (ref ?–60)
THYROPEROXIDASE AB SERPL-ACNC: 5986 U/ML (ref ?–60)
TRIGL SERPL-MCNC: 57 MG/DL (ref 30–149)
TSI SER-ACNC: 7.72 MIU/ML (ref 0.36–3.74)
VLDLC SERPL CALC-MCNC: 9 MG/DL (ref 0–30)

## 2022-11-21 PROCEDURE — 80061 LIPID PANEL: CPT

## 2022-11-21 PROCEDURE — 84443 ASSAY THYROID STIM HORMONE: CPT

## 2022-11-21 PROCEDURE — 0124A SARSCOV2 VAC BVL 30MCG/0.3ML: CPT

## 2022-11-21 PROCEDURE — 86800 THYROGLOBULIN ANTIBODY: CPT

## 2022-11-21 PROCEDURE — 86376 MICROSOMAL ANTIBODY EACH: CPT

## 2022-11-21 PROCEDURE — 80053 COMPREHEN METABOLIC PANEL: CPT

## 2022-11-21 PROCEDURE — 84439 ASSAY OF FREE THYROXINE: CPT

## 2022-11-21 PROCEDURE — 84481 FREE ASSAY (FT-3): CPT

## 2022-11-22 NOTE — TELEPHONE ENCOUNTER
From: Hoda Bruno  To: Belkis Davies MD  Sent: 11/21/2022 11:50 AM CST  Subject: 2nd Shingles booster    Hi Dr. Laurent Albert I hope you are doing well. Mamta Shipako has an appointment with you on Friday November 25th. He needs to get the 2nd Shingles vaccine. Please hazel that at his appointment. Thank you!     Blake Engel

## 2022-11-25 ENCOUNTER — OFFICE VISIT (OUTPATIENT)
Dept: FAMILY MEDICINE CLINIC | Facility: CLINIC | Age: 62
End: 2022-11-25
Payer: COMMERCIAL

## 2022-11-25 ENCOUNTER — PATIENT MESSAGE (OUTPATIENT)
Dept: FAMILY MEDICINE CLINIC | Facility: CLINIC | Age: 62
End: 2022-11-25

## 2022-11-25 VITALS
HEART RATE: 58 BPM | BODY MASS INDEX: 26.64 KG/M2 | HEIGHT: 73 IN | DIASTOLIC BLOOD PRESSURE: 70 MMHG | SYSTOLIC BLOOD PRESSURE: 102 MMHG | RESPIRATION RATE: 16 BRPM | TEMPERATURE: 98 F | WEIGHT: 201 LBS

## 2022-11-25 DIAGNOSIS — R93.1 ABNORMAL CT SCAN OF HEART: ICD-10-CM

## 2022-11-25 DIAGNOSIS — E06.3 HASHIMOTO'S THYROIDITIS: ICD-10-CM

## 2022-11-25 DIAGNOSIS — R79.89 ELEVATED TSH: ICD-10-CM

## 2022-11-25 DIAGNOSIS — E78.00 HYPERCHOLESTEROLEMIA: Primary | ICD-10-CM

## 2022-11-25 DIAGNOSIS — I25.10 ATHEROSCLEROSIS OF NATIVE CORONARY ARTERY OF NATIVE HEART WITHOUT ANGINA PECTORIS: ICD-10-CM

## 2022-11-25 PROCEDURE — 3078F DIAST BP <80 MM HG: CPT | Performed by: FAMILY MEDICINE

## 2022-11-25 PROCEDURE — 99214 OFFICE O/P EST MOD 30 MIN: CPT | Performed by: FAMILY MEDICINE

## 2022-11-25 PROCEDURE — 90471 IMMUNIZATION ADMIN: CPT | Performed by: FAMILY MEDICINE

## 2022-11-25 PROCEDURE — 90750 HZV VACC RECOMBINANT IM: CPT | Performed by: FAMILY MEDICINE

## 2022-11-25 PROCEDURE — 3008F BODY MASS INDEX DOCD: CPT | Performed by: FAMILY MEDICINE

## 2022-11-25 PROCEDURE — 3074F SYST BP LT 130 MM HG: CPT | Performed by: FAMILY MEDICINE

## 2022-11-25 RX ORDER — LEVOTHYROXINE SODIUM 0.05 MG/1
50 TABLET ORAL
Qty: 30 TABLET | Refills: 2 | Status: SHIPPED | OUTPATIENT
Start: 2022-11-25

## 2022-11-25 NOTE — TELEPHONE ENCOUNTER
From: Nneka Jimenez  To: Willa Hu MD  Sent: 11/25/2022 10:16 AM CST  Subject: Rosuvastatin    Hi Dr. Johnathan Oreilly forgot to ask for refills on his cholesterol medication. Could you please have someone call in a refill to [facility] phone number is [phone]. They are filling his Thyroid medication. Thank you!

## 2022-11-25 NOTE — PATIENT INSTRUCTIONS
Continue rosuvastatin 5 mg daily. Low-fat diet. Start levothyroxine 50 mcg 1 tablet on empty stomach once a day , do not eat for 1 hour after taking this medication. Stay active. Recheck blood work before next visit at the end of January 2023.

## 2023-02-03 ENCOUNTER — LAB ENCOUNTER (OUTPATIENT)
Dept: LAB | Age: 63
End: 2023-02-03
Attending: FAMILY MEDICINE
Payer: COMMERCIAL

## 2023-02-03 DIAGNOSIS — R79.89 ELEVATED TSH: ICD-10-CM

## 2023-02-03 DIAGNOSIS — E78.00 HYPERCHOLESTEROLEMIA: ICD-10-CM

## 2023-02-03 LAB
ALBUMIN SERPL-MCNC: 4.1 G/DL (ref 3.4–5)
ALBUMIN/GLOB SERPL: 1.4 {RATIO} (ref 1–2)
ALP LIVER SERPL-CCNC: 92 U/L
ALT SERPL-CCNC: 35 U/L
ANION GAP SERPL CALC-SCNC: 6 MMOL/L (ref 0–18)
AST SERPL-CCNC: 24 U/L (ref 15–37)
BILIRUB SERPL-MCNC: 1.1 MG/DL (ref 0.1–2)
BUN BLD-MCNC: 13 MG/DL (ref 7–18)
BUN/CREAT SERPL: 14.3 (ref 10–20)
CALCIUM BLD-MCNC: 8.9 MG/DL (ref 8.5–10.1)
CHLORIDE SERPL-SCNC: 110 MMOL/L (ref 98–112)
CHOLEST SERPL-MCNC: 150 MG/DL (ref ?–200)
CO2 SERPL-SCNC: 29 MMOL/L (ref 21–32)
CREAT BLD-MCNC: 0.91 MG/DL
FASTING PATIENT LIPID ANSWER: YES
FASTING STATUS PATIENT QL REPORTED: YES
GFR SERPLBLD BASED ON 1.73 SQ M-ARVRAT: 95 ML/MIN/1.73M2 (ref 60–?)
GLOBULIN PLAS-MCNC: 3 G/DL (ref 2.8–4.4)
GLUCOSE BLD-MCNC: 109 MG/DL (ref 70–99)
HDLC SERPL-MCNC: 64 MG/DL (ref 40–59)
LDLC SERPL CALC-MCNC: 72 MG/DL (ref ?–100)
NONHDLC SERPL-MCNC: 86 MG/DL (ref ?–130)
OSMOLALITY SERPL CALC.SUM OF ELEC: 301 MOSM/KG (ref 275–295)
POTASSIUM SERPL-SCNC: 4.7 MMOL/L (ref 3.5–5.1)
PROT SERPL-MCNC: 7.1 G/DL (ref 6.4–8.2)
SODIUM SERPL-SCNC: 145 MMOL/L (ref 136–145)
T3FREE SERPL-MCNC: 2.98 PG/ML (ref 2.4–4.2)
T4 FREE SERPL-MCNC: 1 NG/DL (ref 0.8–1.7)
TRIGL SERPL-MCNC: 74 MG/DL (ref 30–149)
TSI SER-ACNC: 4.54 MIU/ML (ref 0.36–3.74)
VLDLC SERPL CALC-MCNC: 11 MG/DL (ref 0–30)

## 2023-02-03 PROCEDURE — 84481 FREE ASSAY (FT-3): CPT

## 2023-02-03 PROCEDURE — 84443 ASSAY THYROID STIM HORMONE: CPT

## 2023-02-03 PROCEDURE — 80061 LIPID PANEL: CPT

## 2023-02-03 PROCEDURE — 80053 COMPREHEN METABOLIC PANEL: CPT

## 2023-02-03 PROCEDURE — 84439 ASSAY OF FREE THYROXINE: CPT

## 2023-02-08 ENCOUNTER — OFFICE VISIT (OUTPATIENT)
Dept: FAMILY MEDICINE CLINIC | Facility: CLINIC | Age: 63
End: 2023-02-08
Payer: COMMERCIAL

## 2023-02-08 VITALS
RESPIRATION RATE: 18 BRPM | HEART RATE: 58 BPM | SYSTOLIC BLOOD PRESSURE: 104 MMHG | WEIGHT: 203 LBS | DIASTOLIC BLOOD PRESSURE: 70 MMHG | HEIGHT: 73 IN | TEMPERATURE: 98 F | BODY MASS INDEX: 26.9 KG/M2

## 2023-02-08 DIAGNOSIS — E04.9 ENLARGED THYROID: ICD-10-CM

## 2023-02-08 DIAGNOSIS — I25.10 ATHEROSCLEROSIS OF NATIVE CORONARY ARTERY OF NATIVE HEART WITHOUT ANGINA PECTORIS: ICD-10-CM

## 2023-02-08 DIAGNOSIS — R93.1 ABNORMAL CT SCAN OF HEART: ICD-10-CM

## 2023-02-08 DIAGNOSIS — E06.3 HASHIMOTO'S THYROIDITIS: ICD-10-CM

## 2023-02-08 DIAGNOSIS — E78.00 HYPERCHOLESTEROLEMIA: Primary | ICD-10-CM

## 2023-02-08 PROCEDURE — 3078F DIAST BP <80 MM HG: CPT | Performed by: FAMILY MEDICINE

## 2023-02-08 PROCEDURE — 3074F SYST BP LT 130 MM HG: CPT | Performed by: FAMILY MEDICINE

## 2023-02-08 PROCEDURE — 3008F BODY MASS INDEX DOCD: CPT | Performed by: FAMILY MEDICINE

## 2023-02-08 PROCEDURE — 99214 OFFICE O/P EST MOD 30 MIN: CPT | Performed by: FAMILY MEDICINE

## 2023-02-08 RX ORDER — LEVOTHYROXINE SODIUM 0.07 MG/1
75 TABLET ORAL
Qty: 90 TABLET | Refills: 1 | Status: SHIPPED | OUTPATIENT
Start: 2023-02-08

## 2023-02-08 RX ORDER — ROSUVASTATIN CALCIUM 5 MG/1
5 TABLET, COATED ORAL NIGHTLY
Qty: 90 TABLET | Refills: 1 | Status: SHIPPED | OUTPATIENT
Start: 2023-02-08

## 2023-02-08 RX ORDER — LEVOTHYROXINE SODIUM 0.05 MG/1
50 TABLET ORAL
Qty: 90 TABLET | Refills: 0 | Status: CANCELLED | OUTPATIENT
Start: 2023-02-08

## 2023-02-08 NOTE — PATIENT INSTRUCTIONS
Call [phone] to schedule Stress test and US thyroid. Increase Levothyroxine to 75 mcg daily. Continue Rosuvastatin. Check blood test 1 week prior next visit. Healthy diet. Stay active.

## 2023-02-10 ENCOUNTER — HOSPITAL ENCOUNTER (OUTPATIENT)
Dept: ULTRASOUND IMAGING | Age: 63
Discharge: HOME | End: 2023-02-10
Attending: FAMILY MEDICINE
Payer: COMMERCIAL

## 2023-02-10 DIAGNOSIS — E04.2 MULTIPLE THYROID NODULES: Primary | ICD-10-CM

## 2023-02-10 DIAGNOSIS — E04.9 ENLARGED THYROID: ICD-10-CM

## 2023-02-10 PROCEDURE — 76536 US EXAM OF HEAD AND NECK: CPT | Performed by: FAMILY MEDICINE

## 2023-02-15 ENCOUNTER — HOSPITAL ENCOUNTER (OUTPATIENT)
Dept: CV DIAGNOSTICS | Facility: HOSPITAL | Age: 63
Discharge: HOME | End: 2023-02-15
Attending: FAMILY MEDICINE
Payer: COMMERCIAL

## 2023-02-15 DIAGNOSIS — I25.10 ATHEROSCLEROSIS OF NATIVE CORONARY ARTERY OF NATIVE HEART WITHOUT ANGINA PECTORIS: ICD-10-CM

## 2023-02-15 DIAGNOSIS — E78.00 HYPERCHOLESTEROLEMIA: ICD-10-CM

## 2023-02-15 DIAGNOSIS — R93.1 ABNORMAL CT SCAN OF HEART: ICD-10-CM

## 2023-02-15 PROCEDURE — 93350 STRESS TTE ONLY: CPT | Performed by: FAMILY MEDICINE

## 2023-02-15 PROCEDURE — 93017 CV STRESS TEST TRACING ONLY: CPT | Performed by: FAMILY MEDICINE

## 2023-02-15 PROCEDURE — 93018 CV STRESS TEST I&R ONLY: CPT | Performed by: FAMILY MEDICINE

## 2023-02-16 ENCOUNTER — PATIENT MESSAGE (OUTPATIENT)
Dept: FAMILY MEDICINE CLINIC | Facility: CLINIC | Age: 63
End: 2023-02-16

## 2023-03-04 ENCOUNTER — PATIENT MESSAGE (OUTPATIENT)
Dept: FAMILY MEDICINE CLINIC | Facility: CLINIC | Age: 63
End: 2023-03-04

## 2023-03-08 NOTE — TELEPHONE ENCOUNTER
From: Femi Gamino  To: Joann Gordillo MD  Sent: 3/4/2023 10:06 AM CST  Subject: Biopsy    Dr. De Santiago Render I wanted to share with you yesterday I went to Dr. Brent Faustin with [facility] in [facility] for a biopsy. He said I have Heterogeneous Thyroid Tissue. He didn't find nodules on the CD ultrasound that was done in your lab February 10th. Went home without the needle test because there was nothing there to check. He did say that my throat is swollen. My throat does hurt all of the time. What can be done with that? Should I make an appointment with you? Thanks for your help.     Patricia Poole

## 2023-05-03 ENCOUNTER — LAB ENCOUNTER (OUTPATIENT)
Dept: LAB | Age: 63
End: 2023-05-03
Attending: FAMILY MEDICINE
Payer: COMMERCIAL

## 2023-05-03 DIAGNOSIS — E06.3 HASHIMOTO'S THYROIDITIS: ICD-10-CM

## 2023-05-03 DIAGNOSIS — E78.00 HYPERCHOLESTEROLEMIA: ICD-10-CM

## 2023-05-03 LAB
ALBUMIN SERPL-MCNC: 4.4 G/DL (ref 3.4–5)
ALBUMIN/GLOB SERPL: 1.3 {RATIO} (ref 1–2)
ALP LIVER SERPL-CCNC: 82 U/L
ALT SERPL-CCNC: 32 U/L
ANION GAP SERPL CALC-SCNC: 0 MMOL/L (ref 0–18)
AST SERPL-CCNC: 26 U/L (ref 15–37)
BILIRUB SERPL-MCNC: 2 MG/DL (ref 0.1–2)
BUN BLD-MCNC: 11 MG/DL (ref 7–18)
CALCIUM BLD-MCNC: 9.2 MG/DL (ref 8.5–10.1)
CHLORIDE SERPL-SCNC: 108 MMOL/L (ref 98–112)
CHOLEST SERPL-MCNC: 152 MG/DL (ref ?–200)
CO2 SERPL-SCNC: 29 MMOL/L (ref 21–32)
CREAT BLD-MCNC: 0.92 MG/DL
FASTING PATIENT LIPID ANSWER: YES
FASTING STATUS PATIENT QL REPORTED: YES
GFR SERPLBLD BASED ON 1.73 SQ M-ARVRAT: 93 ML/MIN/1.73M2 (ref 60–?)
GLOBULIN PLAS-MCNC: 3.3 G/DL (ref 2.8–4.4)
GLUCOSE BLD-MCNC: 103 MG/DL (ref 70–99)
HDLC SERPL-MCNC: 49 MG/DL (ref 40–59)
LDLC SERPL CALC-MCNC: 87 MG/DL (ref ?–100)
NONHDLC SERPL-MCNC: 103 MG/DL (ref ?–130)
OSMOLALITY SERPL CALC.SUM OF ELEC: 284 MOSM/KG (ref 275–295)
POTASSIUM SERPL-SCNC: 4.4 MMOL/L (ref 3.5–5.1)
PROT SERPL-MCNC: 7.7 G/DL (ref 6.4–8.2)
SODIUM SERPL-SCNC: 137 MMOL/L (ref 136–145)
T4 FREE SERPL-MCNC: 1 NG/DL (ref 0.8–1.7)
TRIGL SERPL-MCNC: 81 MG/DL (ref 30–149)
TSI SER-ACNC: 5.34 MIU/ML (ref 0.36–3.74)
VLDLC SERPL CALC-MCNC: 13 MG/DL (ref 0–30)

## 2023-05-03 PROCEDURE — 84439 ASSAY OF FREE THYROXINE: CPT

## 2023-05-03 PROCEDURE — 84443 ASSAY THYROID STIM HORMONE: CPT

## 2023-05-03 PROCEDURE — 80061 LIPID PANEL: CPT

## 2023-05-03 PROCEDURE — 80053 COMPREHEN METABOLIC PANEL: CPT

## 2023-05-09 ENCOUNTER — OFFICE VISIT (OUTPATIENT)
Dept: FAMILY MEDICINE CLINIC | Facility: CLINIC | Age: 63
End: 2023-05-09
Payer: COMMERCIAL

## 2023-05-09 VITALS
HEIGHT: 73 IN | RESPIRATION RATE: 18 BRPM | SYSTOLIC BLOOD PRESSURE: 102 MMHG | TEMPERATURE: 99 F | DIASTOLIC BLOOD PRESSURE: 60 MMHG | HEART RATE: 64 BPM | BODY MASS INDEX: 27 KG/M2

## 2023-05-09 DIAGNOSIS — E06.3 HASHIMOTO'S THYROIDITIS: ICD-10-CM

## 2023-05-09 DIAGNOSIS — N52.9 ERECTILE DYSFUNCTION, UNSPECIFIED ERECTILE DYSFUNCTION TYPE: ICD-10-CM

## 2023-05-09 DIAGNOSIS — J02.9 SORE THROAT: ICD-10-CM

## 2023-05-09 DIAGNOSIS — E78.00 HYPERCHOLESTEROLEMIA: Primary | ICD-10-CM

## 2023-05-09 DIAGNOSIS — I25.10 ATHEROSCLEROSIS OF NATIVE CORONARY ARTERY OF NATIVE HEART WITHOUT ANGINA PECTORIS: ICD-10-CM

## 2023-05-09 DIAGNOSIS — R93.1 ABNORMAL CT SCAN OF HEART: ICD-10-CM

## 2023-05-09 PROCEDURE — 3078F DIAST BP <80 MM HG: CPT | Performed by: FAMILY MEDICINE

## 2023-05-09 PROCEDURE — 99214 OFFICE O/P EST MOD 30 MIN: CPT | Performed by: FAMILY MEDICINE

## 2023-05-09 PROCEDURE — 3074F SYST BP LT 130 MM HG: CPT | Performed by: FAMILY MEDICINE

## 2023-05-09 RX ORDER — LEVOTHYROXINE SODIUM 0.07 MG/1
75 TABLET ORAL
Qty: 90 TABLET | Refills: 1 | Status: CANCELLED | OUTPATIENT
Start: 2023-05-09

## 2023-05-09 RX ORDER — ROSUVASTATIN CALCIUM 5 MG/1
5 TABLET, COATED ORAL NIGHTLY
Qty: 90 TABLET | Refills: 1 | Status: SHIPPED | OUTPATIENT
Start: 2023-05-09

## 2023-05-09 RX ORDER — LEVOTHYROXINE SODIUM 0.1 MG/1
100 TABLET ORAL DAILY
Qty: 90 TABLET | Refills: 0 | Status: SHIPPED | OUTPATIENT
Start: 2023-05-09 | End: 2024-05-03

## 2023-05-09 RX ORDER — FLUTICASONE PROPIONATE 50 MCG
2 SPRAY, SUSPENSION (ML) NASAL DAILY
Qty: 1 EACH | Refills: 0 | Status: SHIPPED | OUTPATIENT
Start: 2023-05-09 | End: 2024-05-03

## 2023-05-09 RX ORDER — SILDENAFIL 50 MG/1
50 TABLET, FILM COATED ORAL
Qty: 30 TABLET | Refills: 0 | Status: SHIPPED | OUTPATIENT
Start: 2023-05-09

## 2023-05-09 NOTE — PATIENT INSTRUCTIONS
Continue rosuvastatin. Increase levothyroxine to 100 mcg daily. Recheck blood work for thyroid before next visit beginning of August of this year. Start Flonase 2 sprays nostril once a day. Start Claritin 10 mg 1 tablet daily over-the-counter. If your sore throat would not improve see ENT Dr. Maldonado Browning for evaluation. Try Viagra 50 mg 1 tablet as needed.

## 2023-06-01 RX ORDER — FLUTICASONE PROPIONATE 50 MCG
SPRAY, SUSPENSION (ML) NASAL
Qty: 16 ML | Refills: 0 | Status: SHIPPED | OUTPATIENT
Start: 2023-06-01 | End: 2023-06-02 | Stop reason: ALTCHOICE

## 2023-06-02 ENCOUNTER — OFFICE VISIT (OUTPATIENT)
Dept: OTOLARYNGOLOGY | Facility: CLINIC | Age: 63
End: 2023-06-02

## 2023-06-02 DIAGNOSIS — E06.9 THYROIDITIS: Primary | ICD-10-CM

## 2023-06-02 DIAGNOSIS — J02.9 SORE THROAT: ICD-10-CM

## 2023-06-02 DIAGNOSIS — R09.82 PND (POST-NASAL DRIP): ICD-10-CM

## 2023-06-02 RX ORDER — METHYLPREDNISOLONE 4 MG/1
TABLET ORAL
Qty: 21 TABLET | Refills: 0 | Status: SHIPPED | OUTPATIENT
Start: 2023-06-02

## 2023-06-02 RX ORDER — CELECOXIB 200 MG/1
200 CAPSULE ORAL 2 TIMES DAILY
Qty: 56 CAPSULE | Refills: 0 | Status: SHIPPED | OUTPATIENT
Start: 2023-06-02 | End: 2023-06-30

## 2023-06-17 DIAGNOSIS — E06.3 HASHIMOTO'S THYROIDITIS: Primary | ICD-10-CM

## 2023-06-27 ENCOUNTER — PATIENT MESSAGE (OUTPATIENT)
Dept: FAMILY MEDICINE CLINIC | Facility: CLINIC | Age: 63
End: 2023-06-27

## 2023-06-30 RX ORDER — CELECOXIB 200 MG/1
200 CAPSULE ORAL 2 TIMES DAILY
Qty: 56 CAPSULE | Refills: 0 | Status: SHIPPED | OUTPATIENT
Start: 2023-06-30 | End: 2023-07-28

## 2023-07-31 ENCOUNTER — LAB ENCOUNTER (OUTPATIENT)
Dept: LAB | Age: 63
End: 2023-07-31
Attending: FAMILY MEDICINE
Payer: COMMERCIAL

## 2023-07-31 DIAGNOSIS — E06.3 HASHIMOTO'S THYROIDITIS: ICD-10-CM

## 2023-07-31 LAB
T4 FREE SERPL-MCNC: 1.2 NG/DL (ref 0.8–1.7)
TSI SER-ACNC: 2.23 MIU/ML (ref 0.36–3.74)

## 2023-07-31 PROCEDURE — 84443 ASSAY THYROID STIM HORMONE: CPT

## 2023-07-31 PROCEDURE — 84439 ASSAY OF FREE THYROXINE: CPT

## 2023-08-04 ENCOUNTER — OFFICE VISIT (OUTPATIENT)
Dept: FAMILY MEDICINE CLINIC | Facility: CLINIC | Age: 63
End: 2023-08-04
Payer: COMMERCIAL

## 2023-08-04 VITALS
SYSTOLIC BLOOD PRESSURE: 122 MMHG | WEIGHT: 198 LBS | RESPIRATION RATE: 16 BRPM | HEIGHT: 73 IN | DIASTOLIC BLOOD PRESSURE: 70 MMHG | HEART RATE: 60 BPM | TEMPERATURE: 97 F | BODY MASS INDEX: 26.24 KG/M2

## 2023-08-04 DIAGNOSIS — I25.10 ATHEROSCLEROSIS OF NATIVE CORONARY ARTERY OF NATIVE HEART WITHOUT ANGINA PECTORIS: ICD-10-CM

## 2023-08-04 DIAGNOSIS — E06.3 HASHIMOTO'S THYROIDITIS: Primary | ICD-10-CM

## 2023-08-04 DIAGNOSIS — J02.9 SORE THROAT: ICD-10-CM

## 2023-08-04 DIAGNOSIS — E78.00 HYPERCHOLESTEROLEMIA: ICD-10-CM

## 2023-08-04 DIAGNOSIS — Z00.00 LABORATORY TESTS ORDERED AS PART OF A COMPLETE PHYSICAL EXAM (CPE): ICD-10-CM

## 2023-08-04 DIAGNOSIS — E04.2 MULTIPLE THYROID NODULES: ICD-10-CM

## 2023-08-04 PROCEDURE — 3008F BODY MASS INDEX DOCD: CPT | Performed by: FAMILY MEDICINE

## 2023-08-04 PROCEDURE — 3078F DIAST BP <80 MM HG: CPT | Performed by: FAMILY MEDICINE

## 2023-08-04 PROCEDURE — 3074F SYST BP LT 130 MM HG: CPT | Performed by: FAMILY MEDICINE

## 2023-08-04 PROCEDURE — 99214 OFFICE O/P EST MOD 30 MIN: CPT | Performed by: FAMILY MEDICINE

## 2023-08-04 RX ORDER — ROSUVASTATIN CALCIUM 5 MG/1
5 TABLET, COATED ORAL NIGHTLY
Qty: 90 TABLET | Refills: 1 | Status: SHIPPED | OUTPATIENT
Start: 2023-08-04

## 2023-08-04 RX ORDER — LEVOTHYROXINE SODIUM 0.1 MG/1
100 TABLET ORAL DAILY
Qty: 90 TABLET | Refills: 1 | Status: SHIPPED | OUTPATIENT
Start: 2023-08-04 | End: 2024-07-29

## 2023-08-04 NOTE — PATIENT INSTRUCTIONS
Continue current meds. Watch diet for fats and carbs. Stay active. Dr. Albania Navarrete for the sore throat.

## 2023-08-07 ENCOUNTER — PATIENT MESSAGE (OUTPATIENT)
Facility: LOCATION | Age: 63
End: 2023-08-07

## 2023-08-07 NOTE — TELEPHONE ENCOUNTER
From: Althea Galeana  To: Jose M Archuleta MD  Sent: 8/7/2023 6:01 AM CDT  Subject: August 9th    Hello sorry to bother you again. It has already posted on my bank account that I paid my co-pay of $30.00 on August 4th. Please hazel that in your records.     Thanks again  Dustin Dickey

## 2023-08-09 ENCOUNTER — OFFICE VISIT (OUTPATIENT)
Facility: LOCATION | Age: 63
End: 2023-08-09
Payer: COMMERCIAL

## 2023-08-09 DIAGNOSIS — E06.3 HASHIMOTO'S THYROIDITIS: Primary | ICD-10-CM

## 2023-08-09 PROCEDURE — 99214 OFFICE O/P EST MOD 30 MIN: CPT | Performed by: OTOLARYNGOLOGY

## 2023-08-09 RX ORDER — CELECOXIB 200 MG/1
200 CAPSULE ORAL DAILY PRN
Qty: 30 CAPSULE | Refills: 0 | Status: SHIPPED | OUTPATIENT
Start: 2023-08-09 | End: 2023-09-08

## 2023-08-09 NOTE — PROGRESS NOTES
Randall Doty is a 61year old male. Patient presents with:  Sore Throat    HPI:   51-year-old white male has been a longstanding patient of mine recently was placed on full dose levothyroxine 100 mcg. Has noted tenderness in and around the thyroid area and had a nodule on the thyroid that was going to be biopsied but when he showed up to have the biopsy the nodule was miraculously gone. He was here to get my opinion as to what is going on. Current Outpatient Medications   Medication Sig Dispense Refill    levothyroxine 100 MCG Oral Tab Take 1 tablet (100 mcg total) by mouth daily. 90 tablet 1    rosuvastatin 5 MG Oral Tab Take 1 tablet (5 mg total) by mouth nightly. 90 tablet 1    Sildenafil Citrate 50 MG Oral Tab Take 1 tablet (50 mg total) by mouth daily as needed for Erectile Dysfunction. 30 tablet 0      Past Medical History:   Diagnosis Date    Abdominal pain 08/15/29888    Back pain 2019    weight Gain    Diarrhea, unspecified 2019    Food intolerance 2021    lost ability to process sugar, causes nausea, Diarrhea    Heartburn 19    also had heartburn 20 years ago when drinking alcohol    Hemorrhoids 1990    Indigestion 19    Irregular bowel habits 2021    movements 3 times before work, then nothing    Nausea 8/15/2019    Sleep disturbance 2019    stomach pain    Weight gain 8/10/2019    quit smoking on this day.   Also had infection when quitting    Weight loss 2021    trying to find what are my  friendly foods      Social History:  Social History     Socioeconomic History    Marital status:    Tobacco Use    Smoking status: Former     Packs/day: 0.00     Years: 20.00     Pack years: 0.00     Types: Cigarettes     Quit date: 2019     Years since quittin.0    Smokeless tobacco: Former     Quit date: 2019    Tobacco comments:     quit on 2019  when taking antibiotics for 3 months   Vaping Use    Vaping Use: Never used   Substance and Sexual Activity    Alcohol use: No    Drug use: No    Sexual activity: Yes     Partners: Female   Other Topics Concern    Caffeine Concern No     Comment: 2 coffee    Exercise Yes     Comment: active    Seat Belt Yes      Past Surgical History:   Procedure Laterality Date    COLONOSCOPY  11/27/2013    Procedure: COLONOSCOPY;  Surgeon: Diuedonne Vernon MD;  Location: [facility] ENDOSCOPY    COLONOSCOPY  2013    HAND/FINGER SURGERY UNLISTED      27 y ago right thumb surgery    KNEE SURGERY  2/2013    OTHER SURGICAL HISTORY      Ear Surgery, Right ear after contusion    OTHER SURGICAL HISTORY  2013    cyst buttock         REVIEW OF SYSTEMS:   GENERAL HEALTH: feels well otherwise  GENERAL : denies fever, chills, sweats, weight loss, weight gain  SKIN: denies any unusual skin lesions or rashes  RESPIRATORY: denies shortness of breath with exertion  NEURO: denies headaches    EXAM:   There were no vitals taken for this visit. System Pertinent findings Details   Constitutional  Overall appearance - Normal.   Head/Face  Facial features -- Normal. Skull - Normal.   Eyes  Pupils equal ,round ,react to light and accomidate   Ears  External Ear Right: Normal, Left: Normal. Canal - Right: Normal, Left: Normal. TM - Right: Normal left: Normal   Nose  External Nose, Normal, Septum -midline,Nasal Vault, clear. Turbinates - Right: Normal left: Normal   Mouth/Throat  Lips/teeth/gums - Normal. Tonsils -1+ oropharynx - Normal.   Neck Exam  Inspection - Normal. Palpation - Normal. Parotid gland - Normal. Thyroid gland -tenderness around the thyroid gland no cervical lymphadenopathy   Lymph Detail  Submental. Submandibular. Anterior cervical. Posterior cervical. Supraclavicular. I reviewed the blood test he is actually had elevated antithyroglobulin antibodies to confirm the diagnosis of Hashimoto's thyroiditis  ASSESSMENT AND PLAN:   1. Hashimoto's thyroiditis  I reassured the patient told him that he has Hashimoto's thyroiditis.   He has had blood test that confirmed this this is likely why the nodule is gone away. I think being on regular dose of Celebrex or anti-inflammatory would be helpful for his condition. I recommend a 6-month follow-up thyroid ultrasound to make sure the nodules have not recurred. We can follow him up after that is been done. The patient indicates understanding of these issues and agrees to the plan.       Jenny Macias MD  8/9/2023  5:49 PM

## 2023-09-07 RX ORDER — CELECOXIB 200 MG/1
200 CAPSULE ORAL DAILY PRN
Qty: 30 CAPSULE | Refills: 0 | Status: SHIPPED | OUTPATIENT
Start: 2023-09-07 | End: 2023-10-30

## 2023-09-07 NOTE — TELEPHONE ENCOUNTER
Requested Prescriptions     Pending Prescriptions Disp Refills    CELECOXIB 200 MG Oral Cap [Pharmacy Med Name: CELECOXIB 200 MG CAPSULE] 30 capsule 0     Sig: TAKE 1 CAPSULE BY MOUTH DAILY AS NEEDED FOR PAIN.      FILLED- 8/9/23  LOV- 8/9/23    No follow up on file

## 2023-10-30 RX ORDER — CELECOXIB 200 MG/1
200 CAPSULE ORAL DAILY PRN
Qty: 30 CAPSULE | Refills: 0 | Status: SHIPPED | OUTPATIENT
Start: 2023-10-30

## 2023-10-30 NOTE — TELEPHONE ENCOUNTER
Requested Prescriptions     Pending Prescriptions Disp Refills    CELECOXIB 200 MG Oral Cap [Pharmacy Med Name: CELECOXIB 200 MG CAPSULE] 30 capsule 0     Sig: TAKE 1 CAPSULE BY MOUTH DAILY AS NEEDED FOR PAIN.      FILLED- 9/7/23  LOV- 8/9/23    No f/u scheduled

## 2023-11-13 ENCOUNTER — LAB ENCOUNTER (OUTPATIENT)
Dept: LAB | Age: 63
End: 2023-11-13
Attending: FAMILY MEDICINE
Payer: COMMERCIAL

## 2023-11-13 DIAGNOSIS — E78.00 HYPERCHOLESTEROLEMIA: ICD-10-CM

## 2023-11-13 DIAGNOSIS — Z00.00 LABORATORY TESTS ORDERED AS PART OF A COMPLETE PHYSICAL EXAM (CPE): ICD-10-CM

## 2023-11-13 DIAGNOSIS — E06.3 HASHIMOTO'S THYROIDITIS: ICD-10-CM

## 2023-11-13 LAB
ALBUMIN SERPL-MCNC: 4.3 G/DL (ref 3.4–5)
ALBUMIN/GLOB SERPL: 1.3 {RATIO} (ref 1–2)
ALP LIVER SERPL-CCNC: 93 U/L
ALT SERPL-CCNC: 25 U/L
ANION GAP SERPL CALC-SCNC: 2 MMOL/L (ref 0–18)
AST SERPL-CCNC: 21 U/L (ref 15–37)
BASOPHILS # BLD AUTO: 0.09 X10(3) UL (ref 0–0.2)
BASOPHILS NFR BLD AUTO: 1.3 %
BILIRUB SERPL-MCNC: 1 MG/DL (ref 0.1–2)
BUN BLD-MCNC: 19 MG/DL (ref 9–23)
CALCIUM BLD-MCNC: 9.1 MG/DL (ref 8.5–10.1)
CHLORIDE SERPL-SCNC: 109 MMOL/L (ref 98–112)
CHOLEST SERPL-MCNC: 147 MG/DL (ref ?–200)
CO2 SERPL-SCNC: 28 MMOL/L (ref 21–32)
COMPLEXED PSA SERPL-MCNC: 1.5 NG/ML (ref ?–4)
CREAT BLD-MCNC: 0.9 MG/DL
EGFRCR SERPLBLD CKD-EPI 2021: 96 ML/MIN/1.73M2 (ref 60–?)
EOSINOPHIL # BLD AUTO: 0.24 X10(3) UL (ref 0–0.7)
EOSINOPHIL NFR BLD AUTO: 3.4 %
ERYTHROCYTE [DISTWIDTH] IN BLOOD BY AUTOMATED COUNT: 13.2 %
FASTING PATIENT LIPID ANSWER: YES
FASTING STATUS PATIENT QL REPORTED: YES
GLOBULIN PLAS-MCNC: 3.4 G/DL (ref 2.8–4.4)
GLUCOSE BLD-MCNC: 104 MG/DL (ref 70–99)
HCT VFR BLD AUTO: 47.4 %
HDLC SERPL-MCNC: 52 MG/DL (ref 40–59)
HGB BLD-MCNC: 15.6 G/DL
IMM GRANULOCYTES # BLD AUTO: 0.04 X10(3) UL (ref 0–1)
IMM GRANULOCYTES NFR BLD: 0.6 %
LDLC SERPL CALC-MCNC: 84 MG/DL (ref ?–100)
LYMPHOCYTES # BLD AUTO: 2.44 X10(3) UL (ref 1–4)
LYMPHOCYTES NFR BLD AUTO: 34.5 %
MCH RBC QN AUTO: 29.3 PG (ref 26–34)
MCHC RBC AUTO-ENTMCNC: 32.9 G/DL (ref 31–37)
MCV RBC AUTO: 88.9 FL
MONOCYTES # BLD AUTO: 0.52 X10(3) UL (ref 0.1–1)
MONOCYTES NFR BLD AUTO: 7.3 %
NEUTROPHILS # BLD AUTO: 3.75 X10 (3) UL (ref 1.5–7.7)
NEUTROPHILS # BLD AUTO: 3.75 X10(3) UL (ref 1.5–7.7)
NEUTROPHILS NFR BLD AUTO: 52.9 %
NONHDLC SERPL-MCNC: 95 MG/DL (ref ?–130)
OSMOLALITY SERPL CALC.SUM OF ELEC: 291 MOSM/KG (ref 275–295)
PLATELET # BLD AUTO: 213 10(3)UL (ref 150–450)
POTASSIUM SERPL-SCNC: 4.3 MMOL/L (ref 3.5–5.1)
PROT SERPL-MCNC: 7.7 G/DL (ref 6.4–8.2)
RBC # BLD AUTO: 5.33 X10(6)UL
SODIUM SERPL-SCNC: 139 MMOL/L (ref 136–145)
T4 FREE SERPL-MCNC: 1.1 NG/DL (ref 0.8–1.7)
TRIGL SERPL-MCNC: 51 MG/DL (ref 30–149)
TSI SER-ACNC: 2.58 MIU/ML (ref 0.36–3.74)
VLDLC SERPL CALC-MCNC: 8 MG/DL (ref 0–30)
WBC # BLD AUTO: 7.1 X10(3) UL (ref 4–11)

## 2023-11-13 PROCEDURE — 80061 LIPID PANEL: CPT

## 2023-11-13 PROCEDURE — 85025 COMPLETE CBC W/AUTO DIFF WBC: CPT

## 2023-11-13 PROCEDURE — 84439 ASSAY OF FREE THYROXINE: CPT

## 2023-11-13 PROCEDURE — 84443 ASSAY THYROID STIM HORMONE: CPT

## 2023-11-13 PROCEDURE — 80053 COMPREHEN METABOLIC PANEL: CPT

## 2023-11-14 ENCOUNTER — LAB ENCOUNTER (OUTPATIENT)
Dept: LAB | Age: 63
End: 2023-11-14
Attending: FAMILY MEDICINE
Payer: COMMERCIAL

## 2023-11-14 LAB
BILIRUB UR QL STRIP.AUTO: NEGATIVE
CLARITY UR REFRACT.AUTO: CLEAR
COLOR UR AUTO: YELLOW
GLUCOSE UR STRIP.AUTO-MCNC: NORMAL MG/DL
KETONES UR STRIP.AUTO-MCNC: NEGATIVE MG/DL
LEUKOCYTE ESTERASE UR QL STRIP.AUTO: NEGATIVE
NITRITE UR QL STRIP.AUTO: NEGATIVE
PH UR STRIP.AUTO: 5.5 [PH] (ref 5–8)
RBC UR QL AUTO: NEGATIVE
SP GR UR STRIP.AUTO: 1.03 (ref 1–1.03)
UROBILINOGEN UR STRIP.AUTO-MCNC: NORMAL MG/DL

## 2023-11-14 PROCEDURE — 81003 URINALYSIS AUTO W/O SCOPE: CPT

## 2023-11-15 ENCOUNTER — OFFICE VISIT (OUTPATIENT)
Dept: FAMILY MEDICINE CLINIC | Facility: CLINIC | Age: 63
End: 2023-11-15
Payer: COMMERCIAL

## 2023-11-15 VITALS
RESPIRATION RATE: 16 BRPM | TEMPERATURE: 98 F | DIASTOLIC BLOOD PRESSURE: 56 MMHG | WEIGHT: 205 LBS | HEIGHT: 73 IN | HEART RATE: 68 BPM | SYSTOLIC BLOOD PRESSURE: 100 MMHG | BODY MASS INDEX: 27.17 KG/M2

## 2023-11-15 DIAGNOSIS — E78.00 HYPERCHOLESTEROLEMIA: ICD-10-CM

## 2023-11-15 DIAGNOSIS — M54.50 CHRONIC RIGHT-SIDED LOW BACK PAIN WITHOUT SCIATICA: ICD-10-CM

## 2023-11-15 DIAGNOSIS — Z00.00 PHYSICAL EXAM, ANNUAL: Primary | ICD-10-CM

## 2023-11-15 DIAGNOSIS — E06.3 HASHIMOTO'S THYROIDITIS: ICD-10-CM

## 2023-11-15 DIAGNOSIS — G89.29 CHRONIC RIGHT-SIDED LOW BACK PAIN WITHOUT SCIATICA: ICD-10-CM

## 2023-11-15 PROCEDURE — 3008F BODY MASS INDEX DOCD: CPT | Performed by: FAMILY MEDICINE

## 2023-11-15 PROCEDURE — 3074F SYST BP LT 130 MM HG: CPT | Performed by: FAMILY MEDICINE

## 2023-11-15 PROCEDURE — 90471 IMMUNIZATION ADMIN: CPT | Performed by: FAMILY MEDICINE

## 2023-11-15 PROCEDURE — 3078F DIAST BP <80 MM HG: CPT | Performed by: FAMILY MEDICINE

## 2023-11-15 PROCEDURE — 90715 TDAP VACCINE 7 YRS/> IM: CPT | Performed by: FAMILY MEDICINE

## 2023-11-15 PROCEDURE — 99396 PREV VISIT EST AGE 40-64: CPT | Performed by: FAMILY MEDICINE

## 2023-11-15 RX ORDER — SILDENAFIL 50 MG/1
50 TABLET, FILM COATED ORAL
Qty: 30 TABLET | Refills: 0 | Status: SHIPPED | OUTPATIENT
Start: 2023-11-15

## 2023-11-15 NOTE — PATIENT INSTRUCTIONS
Healthy diet. Stay active. See back specialist.  Schedule medication check for February 2024. Do fasting blood work prior to that visit.

## 2024-02-06 ENCOUNTER — PATIENT MESSAGE (OUTPATIENT)
Dept: FAMILY MEDICINE CLINIC | Facility: CLINIC | Age: 64
End: 2024-02-06

## 2024-02-06 DIAGNOSIS — E78.00 HYPERCHOLESTEROLEMIA: ICD-10-CM

## 2024-02-06 RX ORDER — LEVOTHYROXINE SODIUM 0.1 MG/1
100 TABLET ORAL DAILY
Qty: 90 TABLET | Refills: 3 | Status: SHIPPED | OUTPATIENT
Start: 2024-02-06 | End: 2025-01-31

## 2024-02-06 RX ORDER — ROSUVASTATIN CALCIUM 5 MG/1
5 TABLET, COATED ORAL NIGHTLY
Qty: 90 TABLET | Refills: 1 | Status: SHIPPED | OUTPATIENT
Start: 2024-02-06

## 2024-02-06 NOTE — TELEPHONE ENCOUNTER
Requested Prescriptions     Pending Prescriptions Disp Refills    levothyroxine 100 MCG Oral Tab 90 tablet 1     Sig: Take 1 tablet (100 mcg total) by mouth daily.    rosuvastatin 5 MG Oral Tab 90 tablet 1     Sig: Take 1 tablet (5 mg total) by mouth nightly.     Last refill 8/4/23 #90 x 1   LOV 11/15/23  Future Appointments   Date Time Provider Department Center   3/5/2024 10:30 AM WDR  RM1 [facility]   3/13/2024  1:00 PM David Solis MD [facility] [id number]       Rx [facility] Hypothyroid Medications [id number]/06/2024 08:20 AM    CL RX PROTOCOL TSH PAST 12 MONTHS    CL RX PROTOCOL TSH VALUE BETWEEN 0.350 AND 5.500    CL RX APPT WITH ME

## 2024-03-05 ENCOUNTER — HOSPITAL ENCOUNTER (OUTPATIENT)
Dept: ULTRASOUND IMAGING | Age: 64
Discharge: HOME | End: 2024-03-05
Attending: OTOLARYNGOLOGY
Payer: COMMERCIAL

## 2024-03-05 DIAGNOSIS — E06.3 HASHIMOTO'S THYROIDITIS: ICD-10-CM

## 2024-03-05 PROCEDURE — 76536 US EXAM OF HEAD AND NECK: CPT | Performed by: OTOLARYNGOLOGY

## 2024-03-13 ENCOUNTER — OFFICE VISIT (OUTPATIENT)
Facility: LOCATION | Age: 64
End: 2024-03-13
Payer: COMMERCIAL

## 2024-03-13 DIAGNOSIS — H61.23 BILATERAL IMPACTED CERUMEN: Primary | ICD-10-CM

## 2024-03-13 DIAGNOSIS — E06.3 HASHIMOTO'S THYROIDITIS: ICD-10-CM

## 2024-03-13 PROCEDURE — 92504 EAR MICROSCOPY EXAMINATION: CPT | Performed by: OTOLARYNGOLOGY

## 2024-03-13 PROCEDURE — 99214 OFFICE O/P EST MOD 30 MIN: CPT | Performed by: OTOLARYNGOLOGY

## 2024-03-13 NOTE — PROGRESS NOTES
Antonio Galeana is a 63 year old male.   Chief Complaint   Patient presents with    Thyroid Problem     HPI:   63-year-old white male longstanding patient takes levothyroxine 100 mcg has Hashimoto's thyroiditis we are following for thyroid nodule had a biopsy past was benign also complaining of decreased hearing due to wax no otorrhea otalgia vertigo tinnitus.  He was placed on Celebrex and improvement was noted in his symptoms of neck tightness.  Current Outpatient Medications   Medication Sig Dispense Refill    levothyroxine 100 MCG Oral Tab Take 1 tablet (100 mcg total) by mouth daily. 90 tablet 3    rosuvastatin 5 MG Oral Tab Take 1 tablet (5 mg total) by mouth nightly. 90 tablet 1    Sildenafil Citrate 50 MG Oral Tab Take 1 tablet (50 mg total) by mouth daily as needed for Erectile Dysfunction. 30 tablet 0    celecoxib 200 MG Oral Cap Take 1 capsule (200 mg total) by mouth daily as needed for Pain. 30 capsule 0      Past Medical History:   Diagnosis Date    Abdominal pain 08/15/88958    Back pain 2019    weight Gain    Diarrhea, unspecified 2019    Food intolerance 2021    lost ability to process sugar, causes nausea, Diarrhea    Heartburn 19    also had heartburn 20 years ago when drinking alcohol    Hemorrhoids 1990    Indigestion 19    Irregular bowel habits 2021    movements 3 times before work, then nothing    Nausea 8/15/2019    Sleep disturbance 2019    stomach pain    Weight gain 8/10/2019    quit smoking on this day.  Also had infection when quitting    Weight loss 2021    trying to find what are my  friendly foods      Social History:  Social History     Socioeconomic History    Marital status:    Tobacco Use    Smoking status: Former     Packs/day: 0.00     Years: 20.00     Additional pack years: 0.00     Total pack years: 0.00     Types: Cigarettes     Quit date: 2019     Years since quittin.6    Smokeless tobacco: Former     Quit  date: 7/27/2019    Tobacco comments:     quit on 8/5/2019  when taking antibiotics for 3 months   Vaping Use    Vaping Use: Never used   Substance and Sexual Activity    Alcohol use: No    Drug use: No    Sexual activity: Yes     Partners: Female   Other Topics Concern    Caffeine Concern No     Comment: 2 coffee    Exercise Yes     Comment: active    Seat Belt Yes      Past Surgical History:   Procedure Laterality Date    COLONOSCOPY  11/27/2013    Procedure: COLONOSCOPY;  Surgeon: Ilda Pablo MD;  Location:  ENDOSCOPY    COLONOSCOPY  2013    HAND/FINGER SURGERY UNLISTED      30 y ago right thumb surgery    KNEE SURGERY  2/2013    OTHER SURGICAL HISTORY      Ear Surgery, Right ear after contusion    OTHER SURGICAL HISTORY  2013    cyst buttock         REVIEW OF SYSTEMS:   GENERAL HEALTH: feels well otherwise  GENERAL : denies fever, chills, sweats, weight loss, weight gain  SKIN: denies any unusual skin lesions or rashes  RESPIRATORY: denies shortness of breath with exertion  NEURO: denies headaches    EXAM:   There were no vitals taken for this visit.    System Pertinent findings Details   Constitutional  Overall appearance - Normal.   Head/Face  Facial features -- Normal. Skull - Normal.   Eyes  Pupils equal ,round ,react to light and accomidate   Ears  External Ear Right: Normal, Left: Normal. Canal - Right: Normal, Left: Normal. TM - Right: Wax removed TM normal left: Wax removed TM normal   Nose  External Nose, Normal, Septum -midline,Nasal Vault, clear. Turbinates - Right: Normal left: Normal   Mouth/Throat  Lips/teeth/gums - Normal. Tonsils -1+ oropharynx - Normal.   Neck Exam  Inspection - Normal. Palpation - Normal. Parotid gland - Normal. Thyroid gland -normal   Lymph Detail  Submental. Submandibular. Anterior cervical. Posterior cervical. Supraclavicular.   Patients exam showed wax impaction right ear, wax impaction left ear. Ear wax was removed under the operative microscope. No complications.  Patient tolerated the procedure well. Ear exam findings listed in note after removal.    Thyroid ultrasound dated 3/5/2024 shows right mid thyroid measuring 6 x 5 x 8 mm stable, a left lower thyroid measuring 11 x 7 x 9 mm is decreased in size    ASSESSMENT AND PLAN:   1. Bilateral impacted cerumen  As needed follow-up    2. Hashimoto's thyroiditis  18-month follow-up thyroid ultrasound      The patient indicates understanding of these issues and agrees to the plan.      David Solis MD  3/13/2024  1:51 PM

## 2024-04-08 NOTE — TELEPHONE ENCOUNTER
Received refill request for generic celebrex.  Ok to fill? mp    LOV 3/13/24  Last refill 10/30/23 30 cap w/ 0 refills PRN pain  No future appointments.     LOV 3/13/24  ASSESSMENT AND PLAN:   1. Bilateral impacted cerumen  As needed follow-up     2. Hashimoto's thyroiditis  18-month follow-up thyroid ultrasound        The patient indicates understanding of these issues and agrees to the plan.        David Solis MD  3/13/2024  1:51 PM

## 2024-04-15 RX ORDER — CELECOXIB 200 MG/1
200 CAPSULE ORAL DAILY PRN
Qty: 30 CAPSULE | Refills: 0 | Status: SHIPPED | OUTPATIENT
Start: 2024-04-15

## 2024-09-04 RX ORDER — CELECOXIB 200 MG/1
200 CAPSULE ORAL DAILY PRN
Qty: 30 CAPSULE | Refills: 0 | Status: SHIPPED | OUTPATIENT
Start: 2024-09-04

## 2024-09-04 NOTE — TELEPHONE ENCOUNTER
Requested Prescriptions     Pending Prescriptions Disp Refills    celecoxib 200 MG Oral Cap 30 capsule 0     Sig: Take 1 capsule (200 mg total) by mouth daily as needed for Pain.       FILLED- 4/15/24  LOV- 3/13/24    No future appointments.

## 2024-09-18 NOTE — TELEPHONE ENCOUNTER
LOV: 11/15/2023  for: CPX  Patient advised to RTC on:  February 2024.   Medication Quantity Refills Start End   Sildenafil Citrate 50 MG Oral Tab 30 tablet 0 11/15/2023 --   Sig:   Take 1 tablet (50 mg total) by mouth daily as needed for Erectile Dysfunction.

## 2024-09-19 ENCOUNTER — PATIENT MESSAGE (OUTPATIENT)
Dept: FAMILY MEDICINE CLINIC | Facility: CLINIC | Age: 64
End: 2024-09-19

## 2024-09-19 RX ORDER — SILDENAFIL 50 MG/1
50 TABLET, FILM COATED ORAL
Qty: 30 TABLET | Refills: 0 | Status: SHIPPED | OUTPATIENT
Start: 2024-09-19

## 2024-09-19 NOTE — TELEPHONE ENCOUNTER
From: Antonio Galeana  To: Ann Ace  Sent: 9/19/2024 2:07 PM CDT  Subject: RSV    Hi Dr. Ace I hope you are doing well. Prince has an appointment with you on October 21st. Does your office have the RSV vaccine? Our daughter Maxine had her baby boy Jefe on August 21st. Maxine NP wants Prince to get the RSV. The baby is a month. Do you have RSV in your office?    Thank you,  Chelle Galeana    Love being a Jenelle Can't believe Maxine is a mom now. You knew her when she was so little.

## 2024-09-23 NOTE — TELEPHONE ENCOUNTER
Pt's wife called to schedule RSV. Scheduled for 9/27/24. Please place order if appropriate, thank you.

## 2024-09-24 ENCOUNTER — TELEPHONE (OUTPATIENT)
Dept: FAMILY MEDICINE CLINIC | Facility: CLINIC | Age: 64
End: 2024-09-24

## 2024-09-24 NOTE — TELEPHONE ENCOUNTER
Patient needs to check with insurance if the vaccination is covered if they cover shot we can schedule appointment.  I think it was conversation already yesterday regarding that.  Thank you

## 2024-09-24 NOTE — TELEPHONE ENCOUNTER
Spoke to patient's wife- Chelle. HIPAA consent verified.     Confirmed that RSV order was already put in. Scheduled for nurse visit appointment on 10/4.  Patient's spouse states that she confirmed with insurance it is covered.

## 2024-10-03 ENCOUNTER — PATIENT MESSAGE (OUTPATIENT)
Facility: LOCATION | Age: 64
End: 2024-10-03

## 2024-10-03 RX ORDER — CELECOXIB 200 MG/1
200 CAPSULE ORAL DAILY PRN
Qty: 30 CAPSULE | Refills: 0 | Status: SHIPPED | OUTPATIENT
Start: 2024-10-03

## 2024-10-03 NOTE — TELEPHONE ENCOUNTER
Requested Prescriptions     Pending Prescriptions Disp Refills    celecoxib 200 MG Oral Cap 30 capsule 0     Sig: Take 1 capsule (200 mg total) by mouth daily as needed for Pain.       FILLED- 9/4/24  LOV- 3/13/24    No f/u scheduled

## 2024-10-03 NOTE — TELEPHONE ENCOUNTER
From: Antonio Galeana  To: David Solis  Sent: 10/3/2024 7:28 AM CDT  Subject: Celecoxib    Hi Dr. Solis how are you doing? Could you refill Celecoxib for me? Can you put 2 refills. [facility] [address] [place] [address] [phone].    Thank you  Antonio Galeana

## 2024-10-14 ENCOUNTER — LAB ENCOUNTER (OUTPATIENT)
Dept: LAB | Age: 64
End: 2024-10-14
Attending: FAMILY MEDICINE
Payer: COMMERCIAL

## 2024-10-14 DIAGNOSIS — E78.00 HYPERCHOLESTEROLEMIA: ICD-10-CM

## 2024-10-14 DIAGNOSIS — E06.3 HASHIMOTO'S THYROIDITIS: ICD-10-CM

## 2024-10-14 LAB
ALBUMIN SERPL-MCNC: 4.5 G/DL (ref 3.2–4.8)
ALBUMIN/GLOB SERPL: 1.6 {RATIO} (ref 1–2)
ALP LIVER SERPL-CCNC: 85 U/L
ALT SERPL-CCNC: 15 U/L
ANION GAP SERPL CALC-SCNC: 5 MMOL/L (ref 0–18)
AST SERPL-CCNC: 25 U/L (ref ?–34)
BILIRUB SERPL-MCNC: 1.4 MG/DL (ref 0.2–1.1)
BUN BLD-MCNC: 13 MG/DL (ref 9–23)
CALCIUM BLD-MCNC: 9.8 MG/DL (ref 8.7–10.4)
CHLORIDE SERPL-SCNC: 110 MMOL/L (ref 98–112)
CHOLEST SERPL-MCNC: 205 MG/DL (ref ?–200)
CO2 SERPL-SCNC: 25 MMOL/L (ref 21–32)
CREAT BLD-MCNC: 0.8 MG/DL
EGFRCR SERPLBLD CKD-EPI 2021: 99 ML/MIN/1.73M2 (ref 60–?)
FASTING PATIENT LIPID ANSWER: YES
FASTING STATUS PATIENT QL REPORTED: YES
GLOBULIN PLAS-MCNC: 2.8 G/DL (ref 2–3.5)
GLUCOSE BLD-MCNC: 93 MG/DL (ref 70–99)
HDLC SERPL-MCNC: 51 MG/DL (ref 40–59)
LDLC SERPL CALC-MCNC: 137 MG/DL (ref ?–100)
NONHDLC SERPL-MCNC: 154 MG/DL (ref ?–130)
OSMOLALITY SERPL CALC.SUM OF ELEC: 290 MOSM/KG (ref 275–295)
POTASSIUM SERPL-SCNC: 4.6 MMOL/L (ref 3.5–5.1)
PROT SERPL-MCNC: 7.3 G/DL (ref 5.7–8.2)
SODIUM SERPL-SCNC: 140 MMOL/L (ref 136–145)
T4 FREE SERPL-MCNC: 1.5 NG/DL (ref 0.8–1.7)
TRIGL SERPL-MCNC: 92 MG/DL (ref 30–149)
TSI SER-ACNC: 1.33 MIU/ML (ref 0.55–4.78)
VLDLC SERPL CALC-MCNC: 17 MG/DL (ref 0–30)

## 2024-10-14 PROCEDURE — 80061 LIPID PANEL: CPT

## 2024-10-14 PROCEDURE — 80053 COMPREHEN METABOLIC PANEL: CPT

## 2024-10-14 PROCEDURE — 84439 ASSAY OF FREE THYROXINE: CPT

## 2024-10-14 PROCEDURE — 84443 ASSAY THYROID STIM HORMONE: CPT

## 2024-10-14 PROCEDURE — 36415 COLL VENOUS BLD VENIPUNCTURE: CPT

## 2024-10-15 ENCOUNTER — APPOINTMENT (OUTPATIENT)
Dept: URBAN - METROPOLITAN AREA CLINIC 249 | Age: 64
Setting detail: DERMATOLOGY
End: 2024-10-16

## 2024-10-15 DIAGNOSIS — L72.0 EPIDERMAL CYST: ICD-10-CM

## 2024-10-15 DIAGNOSIS — L73.8 OTHER SPECIFIED FOLLICULAR DISORDERS: ICD-10-CM

## 2024-10-15 PROCEDURE — 99202 OFFICE O/P NEW SF 15 MIN: CPT

## 2024-10-15 PROCEDURE — OTHER PRESCRIPTION MEDICATION MANAGEMENT: OTHER

## 2024-10-15 PROCEDURE — OTHER COUNSELING: OTHER

## 2024-10-15 PROCEDURE — OTHER DEFER: OTHER

## 2024-10-15 PROCEDURE — OTHER PRESCRIPTION: OTHER

## 2024-10-15 PROCEDURE — OTHER ELECTRODESICCATION (COSMETIC): OTHER

## 2024-10-15 PROCEDURE — OTHER MIPS QUALITY: OTHER

## 2024-10-15 RX ORDER — TRETIONIN 0.5 MG/G
CREAM TOPICAL
Qty: 20 | Refills: 2 | Status: ERX | COMMUNITY
Start: 2024-10-15

## 2024-10-15 ASSESSMENT — LOCATION DETAILED DESCRIPTION DERM
LOCATION DETAILED: RIGHT MID-UPPER BACK
LOCATION DETAILED: LEFT SUPERIOR MEDIAL MALAR CHEEK
LOCATION DETAILED: LEFT MEDIAL MALAR CHEEK
LOCATION DETAILED: LEFT SUPERIOR CENTRAL MALAR CHEEK

## 2024-10-15 ASSESSMENT — LOCATION SIMPLE DESCRIPTION DERM
LOCATION SIMPLE: LEFT CHEEK
LOCATION SIMPLE: RIGHT UPPER BACK

## 2024-10-15 ASSESSMENT — LOCATION ZONE DERM
LOCATION ZONE: FACE
LOCATION ZONE: TRUNK

## 2024-10-15 NOTE — PROCEDURE: PRESCRIPTION MEDICATION MANAGEMENT
Render In Strict Bullet Format?: No
Detail Level: Zone
Initiate Treatment: tretinoin 0.05 % topical cream \\nQuantity: 20.0 g  Days Supply: 30\\nSig: Apply small amount to oil gland bumps on face at bedtime

## 2024-10-15 NOTE — PROCEDURE: ELECTRODESICCATION (COSMETIC)
Post-Care Instructions: I reviewed with the patient in detail post-care instructions. Patient is to wear sunprotection, and avoid picking at any of the treated lesions. Pt may apply Vaseline to crusted or scabbing areas
Anesthesia Type: 1% lidocaine with epinephrine
Consent: The patient's consent was obtained including but not limited to risks of crusting, scabbing, blistering, scarring, darker or lighter pigmentary change, recurrence, incomplete removal and infection.
Detail Level: Zone
Maya: 7.7
Price (Use Numbers Only, No Special Characters Or $): 100

## 2024-10-21 ENCOUNTER — OFFICE VISIT (OUTPATIENT)
Dept: FAMILY MEDICINE CLINIC | Facility: CLINIC | Age: 64
End: 2024-10-21
Payer: COMMERCIAL

## 2024-10-21 VITALS
WEIGHT: 185 LBS | BODY MASS INDEX: 24.52 KG/M2 | RESPIRATION RATE: 14 BRPM | HEIGHT: 73 IN | SYSTOLIC BLOOD PRESSURE: 102 MMHG | TEMPERATURE: 97 F | HEART RATE: 54 BPM | DIASTOLIC BLOOD PRESSURE: 70 MMHG

## 2024-10-21 DIAGNOSIS — Z12.5 SCREENING FOR PROSTATE CANCER: ICD-10-CM

## 2024-10-21 DIAGNOSIS — E78.00 HYPERCHOLESTEROLEMIA: Primary | ICD-10-CM

## 2024-10-21 DIAGNOSIS — Z00.00 LABORATORY TESTS ORDERED AS PART OF A COMPLETE PHYSICAL EXAM (CPE): ICD-10-CM

## 2024-10-21 DIAGNOSIS — N52.9 ERECTILE DYSFUNCTION, UNSPECIFIED ERECTILE DYSFUNCTION TYPE: ICD-10-CM

## 2024-10-21 DIAGNOSIS — E06.3 HASHIMOTO'S THYROIDITIS: ICD-10-CM

## 2024-10-21 RX ORDER — LEVOTHYROXINE SODIUM 100 UG/1
100 TABLET ORAL DAILY
Qty: 90 TABLET | Refills: 1 | Status: SHIPPED | OUTPATIENT
Start: 2024-10-21 | End: 2025-10-16

## 2024-10-21 RX ORDER — ROSUVASTATIN CALCIUM 5 MG/1
5 TABLET, COATED ORAL NIGHTLY
Qty: 90 TABLET | Refills: 1 | Status: SHIPPED | OUTPATIENT
Start: 2024-10-21

## 2024-10-21 RX ORDER — SILDENAFIL 50 MG/1
50 TABLET, FILM COATED ORAL
Qty: 30 TABLET | Refills: 0 | Status: SHIPPED | OUTPATIENT
Start: 2024-10-21

## 2024-10-21 NOTE — PROGRESS NOTES
Antonio Galeana is a 64 year old male.  Hypothyroidism, thyroid nodules, hypercholesterolemia, abnormal heart scan ED.  HPI:   Hypothyroidism patient still levothyroxine 100 mg daily TSH came back normal.  He is feeling well.  Denies any side effects.  Taking medication empty stomach every day.  We will continue medication    Multiple thyroid nodules on ultrasound patient is seeing ENT Dr. Solis monitor nodules.  .    Hypercholesterolemia patient was  on rosuvastatin 5 mg daily had abnormal CT heart scan.  He is not taking rosuvastatin at all.  There was some confusion about pills.  Will check blood work patient need to go back on medication CT scan came back at 298.  Patient is going to have a blood work done today further recommendation pending results.    Erectile dysfunction he thinks it is after antibiotic he was treated with 4 knee infection.  Would like Viagra prescription.  Refill was called in.    Current Outpatient Medications   Medication Sig Dispense Refill    rosuvastatin 5 MG Oral Tab Take 1 tablet (5 mg total) by mouth nightly. 90 tablet 1    Sildenafil Citrate 50 MG Oral Tab Take 1 tablet (50 mg total) by mouth daily as needed for Erectile Dysfunction. 30 tablet 0    levothyroxine 100 MCG Oral Tab Take 1 tablet (100 mcg total) by mouth daily. 90 tablet 1    celecoxib 200 MG Oral Cap Take 1 capsule (200 mg total) by mouth daily as needed for Pain. 30 capsule 0      Past Medical History:    Abdominal pain    Back pain    weight Gain    Diarrhea, unspecified    Food intolerance    lost ability to process sugar, causes nausea, Diarrhea    Heartburn    also had heartburn 20 years ago when drinking alcohol    Hemorrhoids    Indigestion    Irregular bowel habits    movements 3 times before work, then nothing    Nausea    Sleep disturbance    stomach pain    Weight gain    quit smoking on this day.  Also had infection when quitting    Weight loss    trying to find what are my  friendly foods      Social  History:  Social History     Socioeconomic History    Marital status:    Tobacco Use    Smoking status: Former     Current packs/day: 0.00     Types: Cigarettes     Quit date: 1999     Years since quittin.2    Smokeless tobacco: Former     Quit date: 2019    Tobacco comments:     quit on 2019  when taking antibiotics for 3 months   Vaping Use    Vaping status: Never Used   Substance and Sexual Activity    Alcohol use: No    Drug use: No    Sexual activity: Yes     Partners: Female   Other Topics Concern    Caffeine Concern No     Comment: 2 coffee    Exercise Yes     Comment: active    Seat Belt Yes        REVIEW OF SYSTEMS:   GENERAL HEALTH: feels well otherwise  SKIN: denies any unusual skin lesions or rashes  HEENT no congestion no runny nose no sore throat  Neck no neck pain   RESPIRATORY: denies shortness of breath with exertion  CARDIOVASCULAR: denies chest pain on exertion  GI: denies abdominal pain and denies heartburn  NEURO: denies headaches  Psych normal mood,     /70 (BP Location: Left arm, Patient Position: Sitting, Cuff Size: adult)   Pulse 54   Temp 96.9 °F (36.1 °C) (Temporal)   Resp 14   Ht 6' 1\" (1.854 m)   Wt 185 lb (83.9 kg)   BMI 24.41 kg/m²   GENERAL: well developed, well nourished,in no apparent distress  SKIN: no rashes,no suspicious lesions  HEENT: atraumatic, normocephalic,ears and throat are clear  NECK: supple,no adenopathy,  LUNGS: clear to auscultation  CARDIO: RRR without murmur  GI: good BS's,no masses, HSM or tenderness  EXTREMITIES: no  edema  Psychiatric - alert  and oriented x3, normal mood    Results for orders placed or performed in visit on 10/14/24   TSH and Free T4 [E]    Collection Time: 10/14/24 10:44 AM   Result Value Ref Range    Free T4 1.5 0.8 - 1.7 ng/dL    TSH 1.332 0.550 - 4.780 mIU/mL   Lipid Panel    Collection Time: 10/14/24 10:44 AM   Result Value Ref Range    Cholesterol, Total 205 (H) <200 mg/dL    HDL Cholesterol 51 40 - 59  mg/dL    Triglycerides 92 30 - 149 mg/dL    LDL Cholesterol 137 (H) <100 mg/dL    VLDL 17 0 - 30 mg/dL    Non HDL Chol 154 (H) <130 mg/dL    Patient Fasting for Lipid? Yes    Comp Metabolic Panel (14)    Collection Time: 10/14/24 10:44 AM   Result Value Ref Range    Glucose 93 70 - 99 mg/dL    Sodium 140 136 - 145 mmol/L    Potassium 4.6 3.5 - 5.1 mmol/L    Chloride 110 98 - 112 mmol/L    CO2 25.0 21.0 - 32.0 mmol/L    Anion Gap 5 0 - 18 mmol/L    BUN 13 9 - 23 mg/dL    Creatinine 0.80 0.70 - 1.30 mg/dL    Calcium, Total 9.8 8.7 - 10.4 mg/dL    Calculated Osmolality 290 275 - 295 mOsm/kg    eGFR-Cr 99 >=60 mL/min/1.73m2    AST 25 <34 U/L    ALT 15 10 - 49 U/L    Alkaline Phosphatase 85 45 - 117 U/L    Bilirubin, Total 1.4 (H) 0.2 - 1.1 mg/dL    Total Protein 7.3 5.7 - 8.2 g/dL    Albumin 4.5 3.2 - 4.8 g/dL    Globulin  2.8 2.0 - 3.5 g/dL    A/G Ratio 1.6 1.0 - 2.0    Patient Fasting for CMP? Yes       ASSESSMENT AND PLAN:     Encounter Diagnoses   Name Primary?    Hypercholesterolemia Yes    Hashimoto's thyroiditis     Erectile dysfunction, unspecified erectile dysfunction type     Laboratory tests ordered as part of a complete physical exam (CPE)     Screening for prostate cancer        Orders Placed This Encounter   Procedures    CBC With Differential With Platelet    Comp Metabolic Panel (14)    Lipid Panel    Urinalysis with Culture Reflex    PSA Total, Screen       Meds & Refills for this Visit:  Requested Prescriptions     Signed Prescriptions Disp Refills    rosuvastatin 5 MG Oral Tab 90 tablet 1     Sig: Take 1 tablet (5 mg total) by mouth nightly.    Sildenafil Citrate 50 MG Oral Tab 30 tablet 0     Sig: Take 1 tablet (50 mg total) by mouth daily as needed for Erectile Dysfunction.    levothyroxine 100 MCG Oral Tab 90 tablet 1     Sig: Take 1 tablet (100 mcg total) by mouth daily.   Align - probiotic.   Continue current meds.  Start back on rosuvastatin.  Watch diet for fats and carbs.   Stay active.    Do  labs prior physical.     Imaging & Consults:  None    The patient indicates understanding of these issues and agrees to the plan.  The patient is asked to return in  Dec for complete physical.

## 2024-10-21 NOTE — PATIENT INSTRUCTIONS
Align - probiotic.   Continue current meds.  Start back on rosuvastatin.  Watch diet for fats and carbs.   Stay active.    Do labs prior physical.   Refill policies:      Allow 3 business days for refills; controlled substances may take longer.  Contact your pharmacy at least 5-7 business days prior to running out of medication and have them send an electronic request or submit through the \"request refill\" option thru your [employer] account. No need to do both, as multiple requests will create an automated [employer] message to notify of a denial for one of the duplicated requests, causing you undue confusion.   Refills are NOT addressed on weekends; covering physicians do not authorize routine medications on weekends.  No narcotics or controlled substances are refilled after noon on Fridays or by on call physicians.  By law, narcotics cannot be faxed or phoned into your pharmacy.  If your prescription is due for a refill, you may be due for a follow up appointment. Please call our office at [phone] to make an appointment or schedule an appointment via [employer].  To best provide you care, patients receiving routine medications need to be seen at least twice a year. Patients receiving narcotic/controlled substance medications need to be seen at least once every 3 months.  In the event that your preferred pharmacy does not have the requested medication in stock (ie Backordered), it is your responsibility to find another pharmacy that has the requested medication available. We will gladly send a new prescription to that pharmacy at your request.  controlled substances may not be able to be filled out of state due to license restrictions.  If you have a planned trip, it's best to call your pharmacy at least 5-7 business days to prevent any delays in your medication refill.    Scheduling Tests:    If your physician has ordered radiology tests such as MRI or CT scans, please contact Central Scheduling at [phone] right  away to schedule the test.  Once scheduled, the [facility] Centralized Referral Team will work with your insurance carrier to obtain pre-certification or prior authorization.  Depending on your insurance carrier, approval may take 3-10 days.  It is highly recommended patients assure they have received an authorization before having a test performed.  If test is done without insurance authorization, patient may be responsible for the entire amount billed.      Precertification and Prior Authorizations:  If your physician has recommended that you have a procedure or additional testing performed the [facility] Centralized Referral Team will contact your insurance carrier to obtain pre-certification or prior authorization.    You are strongly encouraged to contact your insurance carrier to verify that your procedure/test has been approved and is a COVERED benefit.  Although the [facility] Centralized Referral Team does its due diligence, the insurance carrier gives the disclaimer that \"Although the procedure is authorized, this does not guarantee payment.\"    Ultimately the patient is responsible for payment.   Thank you for your understanding in this matter.  Paperwork Completion:  If you require FMLA or disability paperwork for your recovery, please make sure to either drop it off or have it faxed to our office at [phone]. Be sure the form has your name and date of birth on it.  The form will be faxed to our Forms Department and they will complete it for you.  There is a 25$ fee for all forms that need to be filled out.  Please be aware there is a 10-14 day turnaround time.  You will need to sign a release of information (CHEMA) form if your paperwork does not come with one.  You may call the Forms Department with any questions at [phone].  Their fax number is [phone].

## 2024-10-30 ENCOUNTER — APPOINTMENT (OUTPATIENT)
Dept: URBAN - METROPOLITAN AREA CLINIC 249 | Age: 64
Setting detail: DERMATOLOGY
End: 2024-10-31

## 2024-10-30 DIAGNOSIS — L72.8 OTHER FOLLICULAR CYSTS OF THE SKIN AND SUBCUTANEOUS TISSUE: ICD-10-CM

## 2024-10-30 PROBLEM — D48.5 NEOPLASM OF UNCERTAIN BEHAVIOR OF SKIN: Status: ACTIVE | Noted: 2024-10-30

## 2024-10-30 PROCEDURE — 13101 CMPLX RPR TRUNK 2.6-7.5 CM: CPT

## 2024-10-30 PROCEDURE — OTHER EXCISION: OTHER

## 2024-10-30 PROCEDURE — 11402 EXC TR-EXT B9+MARG 1.1-2 CM: CPT

## 2024-10-30 ASSESSMENT — LOCATION DETAILED DESCRIPTION DERM: LOCATION DETAILED: RIGHT MID-UPPER BACK

## 2024-10-30 ASSESSMENT — LOCATION ZONE DERM: LOCATION ZONE: TRUNK

## 2024-10-30 ASSESSMENT — LOCATION SIMPLE DESCRIPTION DERM: LOCATION SIMPLE: RIGHT UPPER BACK

## 2024-10-30 NOTE — PROCEDURE: EXCISION

## 2024-11-13 ENCOUNTER — APPOINTMENT (OUTPATIENT)
Dept: URBAN - METROPOLITAN AREA CLINIC 249 | Age: 64
Setting detail: DERMATOLOGY
End: 2024-11-13

## 2024-11-13 DIAGNOSIS — L72.0 EPIDERMAL CYST: ICD-10-CM

## 2024-11-13 PROCEDURE — OTHER SUTURE REMOVAL (GLOBAL PERIOD): OTHER

## 2024-11-13 PROCEDURE — OTHER COUNSELING: OTHER

## 2024-11-13 PROCEDURE — OTHER ADDITIONAL NOTES: OTHER

## 2024-11-13 PROCEDURE — 99024 POSTOP FOLLOW-UP VISIT: CPT

## 2024-11-13 ASSESSMENT — LOCATION DETAILED DESCRIPTION DERM
LOCATION DETAILED: RIGHT MID-UPPER BACK
LOCATION DETAILED: SUPERIOR LUMBAR SPINE

## 2024-11-13 ASSESSMENT — LOCATION SIMPLE DESCRIPTION DERM
LOCATION SIMPLE: RIGHT UPPER BACK
LOCATION SIMPLE: LOWER BACK

## 2024-11-13 ASSESSMENT — LOCATION ZONE DERM: LOCATION ZONE: TRUNK

## 2024-11-13 NOTE — PROCEDURE: ADDITIONAL NOTES
Additional Notes: Pt will schedule excision for other cyst
Render Risk Assessment In Note?: no
Detail Level: Simple

## 2024-11-13 NOTE — PROCEDURE: SUTURE REMOVAL (GLOBAL PERIOD)
Detail Level: Detailed
Add 44120 Cpt? (Important Note: In 2017 The Use Of 06242 Is Being Tracked By Cms To Determine Future Global Period Reimbursement For Global Periods): yes

## 2024-12-04 ENCOUNTER — TELEPHONE (OUTPATIENT)
Dept: ORTHOPEDICS CLINIC | Facility: CLINIC | Age: 64
End: 2024-12-04

## 2024-12-04 DIAGNOSIS — M25.561 RIGHT KNEE PAIN, UNSPECIFIED CHRONICITY: Primary | ICD-10-CM

## 2024-12-04 NOTE — TELEPHONE ENCOUNTER
Patient scheduled for right knee pain with Bandar. Please advise if imaging is needed    Future Appointments   Date Time Provider Department Center   12/18/2024  8:00 AM Ann Ace MD [facility] [id number]   12/18/2024  2:20 PM Bandar Bazan PA-C [facility]

## 2024-12-05 ENCOUNTER — TELEPHONE (OUTPATIENT)
Dept: ORTHOPEDICS CLINIC | Facility: CLINIC | Age: 64
End: 2024-12-05

## 2024-12-05 NOTE — TELEPHONE ENCOUNTER
Future Appointments   Date Time Provider Department Center   12/18/2024  8:00 AM Ann Ace MD [facility] [id number]   12/18/2024  1:50 PM NAP XR RM1 [facility] X[facility] [place]   12/18/2024  2:20 PM Bandar Bazan PA-C [facility]     Called home phone and spoke with wife per CHEMA. Informed wife that xray scheduled immediately prior to office visit. Wife verbalized understanding and states will update patient.

## 2024-12-05 NOTE — TELEPHONE ENCOUNTER
Patient has an appointment scheduled with Bandar Bazan on 12/18/24 for right sore knee, same area as a meniscus repair 15 years ago. Please advise if imaging is needed prior.

## 2024-12-05 NOTE — TELEPHONE ENCOUNTER
[facility]    12/5/24  9:04 AM  Note     Patient has an appointment scheduled with Bandar Bazan on 12/18/24 for right sore knee, same area as a meniscus repair 15 years ago. Please advise if imaging is needed prior.

## 2024-12-10 ENCOUNTER — LAB ENCOUNTER (OUTPATIENT)
Dept: LAB | Age: 64
End: 2024-12-10
Attending: FAMILY MEDICINE
Payer: COMMERCIAL

## 2024-12-10 DIAGNOSIS — Z12.5 SCREENING FOR PROSTATE CANCER: ICD-10-CM

## 2024-12-10 DIAGNOSIS — Z00.00 LABORATORY TESTS ORDERED AS PART OF A COMPLETE PHYSICAL EXAM (CPE): ICD-10-CM

## 2024-12-10 LAB
ALBUMIN SERPL-MCNC: 4.9 G/DL (ref 3.2–4.8)
ALBUMIN/GLOB SERPL: 1.5 {RATIO} (ref 1–2)
ALP LIVER SERPL-CCNC: 85 U/L
ALT SERPL-CCNC: 24 U/L
ANION GAP SERPL CALC-SCNC: 6 MMOL/L (ref 0–18)
AST SERPL-CCNC: 35 U/L (ref ?–34)
BASOPHILS # BLD AUTO: 0.09 X10(3) UL (ref 0–0.2)
BASOPHILS NFR BLD AUTO: 1.3 %
BILIRUB SERPL-MCNC: 2 MG/DL (ref 0.2–1.1)
BILIRUB UR QL STRIP.AUTO: NEGATIVE
BUN BLD-MCNC: 17 MG/DL (ref 9–23)
CALCIUM BLD-MCNC: 10 MG/DL (ref 8.7–10.4)
CHLORIDE SERPL-SCNC: 107 MMOL/L (ref 98–112)
CHOLEST SERPL-MCNC: 147 MG/DL (ref ?–200)
CLARITY UR REFRACT.AUTO: CLEAR
CO2 SERPL-SCNC: 28 MMOL/L (ref 21–32)
COMPLEXED PSA SERPL-MCNC: 1.02 NG/ML (ref ?–4)
CREAT BLD-MCNC: 0.84 MG/DL
EGFRCR SERPLBLD CKD-EPI 2021: 97 ML/MIN/1.73M2 (ref 60–?)
EOSINOPHIL # BLD AUTO: 0.18 X10(3) UL (ref 0–0.7)
EOSINOPHIL NFR BLD AUTO: 2.6 %
ERYTHROCYTE [DISTWIDTH] IN BLOOD BY AUTOMATED COUNT: 13.2 %
FASTING PATIENT LIPID ANSWER: YES
FASTING STATUS PATIENT QL REPORTED: YES
GLOBULIN PLAS-MCNC: 3.3 G/DL (ref 2–3.5)
GLUCOSE BLD-MCNC: 98 MG/DL (ref 70–99)
GLUCOSE UR STRIP.AUTO-MCNC: NORMAL MG/DL
HCT VFR BLD AUTO: 48.5 %
HDLC SERPL-MCNC: 63 MG/DL (ref 40–59)
HGB BLD-MCNC: 16.4 G/DL
IMM GRANULOCYTES # BLD AUTO: 0.01 X10(3) UL (ref 0–1)
IMM GRANULOCYTES NFR BLD: 0.1 %
KETONES UR STRIP.AUTO-MCNC: NEGATIVE MG/DL
LDLC SERPL CALC-MCNC: 72 MG/DL (ref ?–100)
LEUKOCYTE ESTERASE UR QL STRIP.AUTO: NEGATIVE
LYMPHOCYTES # BLD AUTO: 2.49 X10(3) UL (ref 1–4)
LYMPHOCYTES NFR BLD AUTO: 36.1 %
MCH RBC QN AUTO: 30.3 PG (ref 26–34)
MCHC RBC AUTO-ENTMCNC: 33.8 G/DL (ref 31–37)
MCV RBC AUTO: 89.5 FL
MONOCYTES # BLD AUTO: 0.51 X10(3) UL (ref 0.1–1)
MONOCYTES NFR BLD AUTO: 7.4 %
NEUTROPHILS # BLD AUTO: 3.62 X10 (3) UL (ref 1.5–7.7)
NEUTROPHILS # BLD AUTO: 3.62 X10(3) UL (ref 1.5–7.7)
NEUTROPHILS NFR BLD AUTO: 52.5 %
NITRITE UR QL STRIP.AUTO: NEGATIVE
NONHDLC SERPL-MCNC: 84 MG/DL (ref ?–130)
OSMOLALITY SERPL CALC.SUM OF ELEC: 294 MOSM/KG (ref 275–295)
PH UR STRIP.AUTO: 6 [PH] (ref 5–8)
PLATELET # BLD AUTO: 224 10(3)UL (ref 150–450)
POTASSIUM SERPL-SCNC: 4.8 MMOL/L (ref 3.5–5.1)
PROT SERPL-MCNC: 8.2 G/DL (ref 5.7–8.2)
PROT UR STRIP.AUTO-MCNC: NEGATIVE MG/DL
RBC # BLD AUTO: 5.42 X10(6)UL
RBC UR QL AUTO: NEGATIVE
SODIUM SERPL-SCNC: 141 MMOL/L (ref 136–145)
SP GR UR STRIP.AUTO: 1.02 (ref 1–1.03)
TRIGL SERPL-MCNC: 54 MG/DL (ref 30–149)
UROBILINOGEN UR STRIP.AUTO-MCNC: NORMAL MG/DL
VLDLC SERPL CALC-MCNC: 8 MG/DL (ref 0–30)
WBC # BLD AUTO: 6.9 X10(3) UL (ref 4–11)

## 2024-12-10 PROCEDURE — 80053 COMPREHEN METABOLIC PANEL: CPT

## 2024-12-10 PROCEDURE — 85025 COMPLETE CBC W/AUTO DIFF WBC: CPT

## 2024-12-10 PROCEDURE — 80061 LIPID PANEL: CPT

## 2024-12-10 PROCEDURE — 36415 COLL VENOUS BLD VENIPUNCTURE: CPT

## 2024-12-10 PROCEDURE — 81003 URINALYSIS AUTO W/O SCOPE: CPT

## 2024-12-17 ENCOUNTER — APPOINTMENT (OUTPATIENT)
Dept: URBAN - METROPOLITAN AREA CLINIC 249 | Age: 64
Setting detail: DERMATOLOGY
End: 2024-12-29

## 2024-12-17 DIAGNOSIS — D485 NEOPLASM OF UNCERTAIN BEHAVIOR OF SKIN: ICD-10-CM

## 2024-12-17 PROBLEM — D48.5 NEOPLASM OF UNCERTAIN BEHAVIOR OF SKIN: Status: ACTIVE | Noted: 2024-12-17

## 2024-12-17 PROCEDURE — 12032 INTMD RPR S/A/T/EXT 2.6-7.5: CPT

## 2024-12-17 PROCEDURE — 11402 EXC TR-EXT B9+MARG 1.1-2 CM: CPT

## 2024-12-17 PROCEDURE — OTHER EXCISION: OTHER

## 2024-12-17 ASSESSMENT — LOCATION DETAILED DESCRIPTION DERM: LOCATION DETAILED: SUPERIOR LUMBAR SPINE

## 2024-12-17 ASSESSMENT — LOCATION SIMPLE DESCRIPTION DERM: LOCATION SIMPLE: LOWER BACK

## 2024-12-17 ASSESSMENT — LOCATION ZONE DERM: LOCATION ZONE: TRUNK

## 2024-12-17 NOTE — PROCEDURE: EXCISION

## 2024-12-18 ENCOUNTER — OFFICE VISIT (OUTPATIENT)
Dept: ORTHOPEDICS CLINIC | Facility: CLINIC | Age: 64
End: 2024-12-18
Payer: COMMERCIAL

## 2024-12-18 ENCOUNTER — HOSPITAL ENCOUNTER (OUTPATIENT)
Dept: GENERAL RADIOLOGY | Age: 64
Discharge: HOME | End: 2024-12-18
Attending: PHYSICIAN ASSISTANT
Payer: COMMERCIAL

## 2024-12-18 VITALS — WEIGHT: 180 LBS | BODY MASS INDEX: 23.86 KG/M2 | HEIGHT: 73 IN

## 2024-12-18 DIAGNOSIS — M25.561 RIGHT KNEE PAIN, UNSPECIFIED CHRONICITY: ICD-10-CM

## 2024-12-18 DIAGNOSIS — M17.11 PRIMARY OSTEOARTHRITIS OF RIGHT KNEE: Primary | ICD-10-CM

## 2024-12-18 PROCEDURE — 73564 X-RAY EXAM KNEE 4 OR MORE: CPT | Performed by: PHYSICIAN ASSISTANT

## 2024-12-18 PROCEDURE — 20610 DRAIN/INJ JOINT/BURSA W/O US: CPT | Performed by: PHYSICIAN ASSISTANT

## 2024-12-18 PROCEDURE — 99203 OFFICE O/P NEW LOW 30 MIN: CPT | Performed by: PHYSICIAN ASSISTANT

## 2024-12-18 RX ORDER — TRIAMCINOLONE ACETONIDE 40 MG/ML
40 INJECTION, SUSPENSION INTRA-ARTICULAR; INTRAMUSCULAR ONCE
Status: COMPLETED | OUTPATIENT
Start: 2024-12-18 | End: 2024-12-18

## 2024-12-18 RX ADMIN — TRIAMCINOLONE ACETONIDE 40 MG: 40 INJECTION, SUSPENSION INTRA-ARTICULAR; INTRAMUSCULAR at 14:42:00

## 2024-12-18 NOTE — H&P
[facility] New Patient Note    CC:   Chief Complaint   Patient presents with    Knee Pain     RT MEDIAL KNEE PAIN, ONSET: 3-4 YRS AGO ; MMT 15YRS AGO ; RUNNING UP STAIRS ; H/O SX     HPI: This 64 year old male presents today with complaints of right knee pain.  Patient reports over the last 3 to 4 years that he has experienced intermittent medial right knee pain made worse with stair climbing, assuming his golf stance, prolonged walking.  Denies any clicking, popping, locking, swelling of the right knee.  Reports that he alleviates with Celebrex as needed.  He has underwent previous arthroscopic surgery in the right knee 15 years ago and had done very well.. In 2019, patient injured his knee while chopping wood with a hatchet, ultimately resulting in septic arthritis of his native left knee requiring three separate I & D's to completely eradicate the infection.      Past Medical History:    Abdominal pain    Back pain    weight Gain    Diarrhea, unspecified    Food intolerance    lost ability to process sugar, causes nausea, Diarrhea    Heartburn    also had heartburn 20 years ago when drinking alcohol    Hemorrhoids    Indigestion    Irregular bowel habits    movements 3 times before work, then nothing    Nausea    Sleep disturbance    stomach pain    Weight gain    quit smoking on this day.  Also had infection when quitting    Weight loss    trying to find what are my  friendly foods     Past Surgical History:   Procedure Laterality Date    Colonoscopy  11/27/2013    Procedure: COLONOSCOPY;  Surgeon: Ilda Pablo MD;  Location:  ENDOSCOPY    Colonoscopy  2013    Hand/finger surgery unlisted      30 y ago right thumb surgery    Knee surgery  2/2013    Other surgical history      Ear Surgery, Right ear after contusion    Other surgical history  2013    cyst buttock     Current Outpatient Medications   Medication Sig Dispense Refill    rosuvastatin 5 MG Oral Tab Take 1 tablet (5 mg total) by mouth  nightly. 90 tablet 1    Sildenafil Citrate 50 MG Oral Tab Take 1 tablet (50 mg total) by mouth daily as needed for Erectile Dysfunction. 30 tablet 0    levothyroxine 100 MCG Oral Tab Take 1 tablet (100 mcg total) by mouth daily. 90 tablet 1    celecoxib 200 MG Oral Cap Take 1 capsule (200 mg total) by mouth daily as needed for Pain. 30 capsule 0     Allergies[1]  Family History   Problem Relation Age of Onset    Lipids Father     Heart Disorder Maternal Grandfather         MI 68    Heart Attack Maternal Grandfather         age 65    Other (Other) Sister         obese, cocaine overdose    Other (Other) Brother         colon polyp     Social History     Occupational History    Not on file   Tobacco Use    Smoking status: Former     Current packs/day: 0.00     Types: Cigarettes     Quit date: 1999     Years since quittin.3    Smokeless tobacco: Former     Quit date: 2019    Tobacco comments:     quit on 2019  when taking antibiotics for 3 months   Vaping Use    Vaping status: Never Used   Substance and Sexual Activity    Alcohol use: No    Drug use: No    Sexual activity: Yes     Partners: Female        ROS:  Comprehensive system review obtained and negative except as mentioned above    Physical Exam:    Ht 6' 1\" (1.854 m)   Wt 180 lb (81.6 kg)   BMI 23.75 kg/m²   Constitutional: Awake, alert, no distress. Very pleasant.  Psychological: Appropriate affect.  Respiratory: Unlabored breathing.  Right lower extremity:  Inspection: skin is intact without any redness or deformity. No appreciable effusion.   Palpation: Tender palpation along the medial joint line.  Range of motion: Knee can extend to 0 and flex to approximately 140 degrees.  Knee is stable to valgus and varus stress at 0 and 30 degrees. No laxity with anterior or posterior drawer.  No pain with valgus stress testing.  Neuromuscular: Strength is normal and sensation is intact.  Vascular: Extremities are warm and well-perfused.  Lymph:  Unremarkable.    Imaging: Imaging was personally viewed, independently interpreted and radiology report read.  Radiographs of the right knee obtained today demonstrates mild medial compartmental joint space narrowing as well as mild  lateralization of the patella within the patellofemoral compartment with mild patellofemoral joint space narrowing.    Assessment/Plan:  Diagnoses and all orders for this visit:    Primary osteoarthritis of right knee  -     Large joint - right aspiration/injection  -     triamcinolone acetonide (Kenalog-40) 40 MG/ML injection 40 mg      Assessment: 64-year-old male with symptomatic radiographically mild right knee osteoarthritis    Plan: I discussed the etiology, natural history, and management options for symptomatic knee osteoarthritis.  I discussed nonsurgical and surgical treatments, with nonsurgical treatments to include anti-inflammatory medications, injections, activity modification, weight loss, low impact exercise and possible therapy.  Surgery would be with knee replacement and is an elective operation reserved for when nonsurgical treatments no longer alleviate symptoms sufficiently.  Patient understands the discussion and would like to continue with nonsurgical measures.  He particularly expressed interest in receiving a steroid injection into the right knee in clinic today. A cortisone injection was administered into the right knee in clinic today, please see separate procedure note for additional details. I explained the risks of the injection with the patient including failure to relieve pain and possible cartilage wear with chronic use of injections. I explained to the patient that the cortisone may take 2-3 days to take effect. I explained that the injection could be repeated at least every 3 months as long as it continues to provide relief of symptoms. The patient may contact our office if they are interested in repeating the injection, and we can schedule them for a  procedure-only appointment.         Bandar Bazan PA-C  [facility] Orth[facility]    This note was dictated using Dragon software.  While it was briefly proofread prior to completion, some grammatical, spelling, and word choice errors due to dictation may still occur.         [1]   Allergies  Allergen Reactions    Ceftriaxone HIVES and SWELLING     Swelling to joints

## 2024-12-18 NOTE — PROCEDURES
Risks and benefits of knee injection discussed with the patient, with risks including but not limited to pain and swelling at the injection site and/or within the knee joint, infection, elevation in blood pressure and/or glucose levels, facial flushing. After informed consent, the patient's right knee was marked, locally anesthetized with skin refrigerant, prepped with topical antiseptic, and injected with a mixture of 1mL 40mg/mL Kenalog, 2mL 1% lidocaine and 2mL 0.5% marcaine through the inferolateral portal.  A band-aid was applied.  The patient tolerated the procedure well.    Bandar Bazan PA-C  [facility] Orth[facility]

## 2024-12-19 ENCOUNTER — OFFICE VISIT (OUTPATIENT)
Dept: FAMILY MEDICINE CLINIC | Facility: CLINIC | Age: 64
End: 2024-12-19
Payer: COMMERCIAL

## 2024-12-19 VITALS
TEMPERATURE: 97 F | BODY MASS INDEX: 24.92 KG/M2 | SYSTOLIC BLOOD PRESSURE: 108 MMHG | DIASTOLIC BLOOD PRESSURE: 80 MMHG | RESPIRATION RATE: 14 BRPM | WEIGHT: 188 LBS | HEIGHT: 73 IN | HEART RATE: 58 BPM

## 2024-12-19 DIAGNOSIS — Z00.00 PHYSICAL EXAM, ANNUAL: Primary | ICD-10-CM

## 2024-12-19 DIAGNOSIS — E78.00 HYPERCHOLESTEROLEMIA: ICD-10-CM

## 2024-12-19 PROCEDURE — 99396 PREV VISIT EST AGE 40-64: CPT | Performed by: FAMILY MEDICINE

## 2024-12-19 NOTE — PATIENT INSTRUCTIONS
Healthy diet.  Stay active.   Do fasting blood work prior visit in April.  Schedule Medicare visit for April 2025.        Refill policies:      Allow 3 business days for refills; controlled substances may take longer.  Contact your pharmacy at least 5-7 business days prior to running out of medication and have them send an electronic request or submit through the \"request refill\" option thru your NORCAT account. No need to do both, as multiple requests will create an automated NORCAT message to notify of a denial for one of the duplicated requests, causing you undue confusion.   Refills are NOT addressed on weekends; covering physicians do not authorize routine medications on weekends.  No narcotics or controlled substances are refilled after noon on Fridays or by on call physicians.  By law, narcotics cannot be faxed or phoned into your pharmacy.  If your prescription is due for a refill, you may be due for a follow up appointment. Please call our office at [phone] to make an appointment or schedule an appointment via NORCAT.  To best provide you care, patients receiving routine medications need to be seen at least twice a year. Patients receiving narcotic/controlled substance medications need to be seen at least once every 3 months.  In the event that your preferred pharmacy does not have the requested medication in stock (ie Backordered), it is your responsibility to find another pharmacy that has the requested medication available. We will gladly send a new prescription to that pharmacy at your request.  controlled substances may not be able to be filled out of state due to license restrictions.  If you have a planned trip, it's best to call your pharmacy at least 5-7 business days to prevent any delays in your medication refill.    Scheduling Tests:    If your physician has ordered radiology tests such as MRI or CT scans, please contact Central Scheduling at [phone] right away to schedule the test.   Once scheduled, the [facility] Centralized Referral Team will work with your insurance carrier to obtain pre-certification or prior authorization.  Depending on your insurance carrier, approval may take 3-10 days.  It is highly recommended patients assure they have received an authorization before having a test performed.  If test is done without insurance authorization, patient may be responsible for the entire amount billed.      Precertification and Prior Authorizations:  If your physician has recommended that you have a procedure or additional testing performed the [facility] Centralized Referral Team will contact your insurance carrier to obtain pre-certification or prior authorization.    You are strongly encouraged to contact your insurance carrier to verify that your procedure/test has been approved and is a COVERED benefit.  Although the [facility] Centralized Referral Team does its due diligence, the insurance carrier gives the disclaimer that \"Although the procedure is authorized, this does not guarantee payment.\"    Ultimately the patient is responsible for payment.   Thank you for your understanding in this matter.  Paperwork Completion:  If you require FMLA or disability paperwork for your recovery, please make sure to either drop it off or have it faxed to our office at [phone]. Be sure the form has your name and date of birth on it.  The form will be faxed to our Forms Department and they will complete it for you.  There is a 25$ fee for all forms that need to be filled out.  Please be aware there is a 10-14 day turnaround time.  You will need to sign a release of information (CHEMA) form if your paperwork does not come with one.  You may call the Forms Department with any questions at [phone].  Their fax number is [phone].

## 2024-12-19 NOTE — PROGRESS NOTES
Antonio Galeana is a 64 year old male who presents for a complete physical exam.   HPI:   Pt has no complaints today.  Doing well.  He got a cortisone injection to his  Right knee yesterday getting better.      Immunization History   Administered Date(s) Administered    Covid-19 Vaccine Moderna 100 mcg/0.5 ml 03/16/2021, 04/13/2021, 12/20/2021    Covid-19 Vaccine Pfizer 30 mcg/0.3 ml 12/20/2021    Covid-19 Vaccine Pfizer Bivalent 30mcg/0.3mL 11/21/2022    Influenza Vaccine, trivalent (IIV3), PF 0.5mL (40989) 09/24/2024    RSV, bivalent, diluent reconstituted PF (Abrysvo) 09/24/2024    TDAP 11/08/2013, 11/15/2023    Zoster Vaccine Recombinant Adjuvanted (Shingrix) 08/25/2022, 11/25/2022     Wt Readings from Last 6 Encounters:   12/19/24 188 lb (85.3 kg)   12/18/24 180 lb (81.6 kg)   10/21/24 185 lb (83.9 kg)   11/15/23 205 lb (93 kg)   08/04/23 198 lb (89.8 kg)   02/08/23 203 lb (92.1 kg)     Body mass index is 24.8 kg/m².     Cholesterol, Total (mg/dL)   Date Value   12/10/2024 147   10/14/2024 205 (H)   11/13/2023 147     CHOLESTEROL, TOTAL (mg/dL)   Date Value   10/27/2011 201 (H)     CHOLESTEROL (mg/dL)   Date Value   11/08/2013 180   07/02/2012 160     HDL Cholesterol (mg/dL)   Date Value   12/10/2024 63 (H)   10/14/2024 51   11/13/2023 52     HDL CHOLESTEROL (mg/dL)   Date Value   10/27/2011 47     HDL CHOL (mg/dL)   Date Value   11/08/2013 45   07/02/2012 46     LDL Cholesterol (mg/dL)   Date Value   12/10/2024 72   10/14/2024 137 (H)   11/13/2023 84     LDL-CHOLESTEROL (mg/dL (calc))   Date Value   10/27/2011 134 (H)     LDL CHOLESTROL (mg/dL)   Date Value   11/08/2013 126   07/02/2012 101 (H)     AST (U/L)   Date Value   12/10/2024 35 (H)   10/14/2024 25   11/13/2023 21   11/08/2013 20   07/02/2012 21   12/13/2011 21   10/27/2011 21     ALT (U/L)   Date Value   12/10/2024 24   10/14/2024 15   11/13/2023 25   11/08/2013 31   07/02/2012 24   12/13/2011 15   10/27/2011 30      PSA (ng/mL)   Date Value    05/18/2022 1.24   11/20/2018 1.04   11/08/2013 0.881     No results found for: \"GLUCOSE\"    Current Outpatient Medications   Medication Sig Dispense Refill    rosuvastatin 5 MG Oral Tab Take 1 tablet (5 mg total) by mouth nightly. 90 tablet 1    Sildenafil Citrate 50 MG Oral Tab Take 1 tablet (50 mg total) by mouth daily as needed for Erectile Dysfunction. 30 tablet 0    levothyroxine 100 MCG Oral Tab Take 1 tablet (100 mcg total) by mouth daily. 90 tablet 1    celecoxib 200 MG Oral Cap Take 1 capsule (200 mg total) by mouth daily as needed for Pain. 30 capsule 0      Past Medical History:    Abdominal pain    Back pain    weight Gain    Diarrhea, unspecified    Food intolerance    lost ability to process sugar, causes nausea, Diarrhea    Heartburn    also had heartburn 20 years ago when drinking alcohol    Hemorrhoids    Indigestion    Irregular bowel habits    movements 3 times before work, then nothing    Nausea    Sleep disturbance    stomach pain    Weight gain    quit smoking on this day.  Also had infection when quitting    Weight loss    trying to find what are my  friendly foods      Past Surgical History:   Procedure Laterality Date    Colonoscopy  11/27/2013    Procedure: COLONOSCOPY;  Surgeon: Ilda Pablo MD;  Location:  ENDOSCOPY    Colonoscopy  2013    Hand/finger surgery unlisted      30 y ago right thumb surgery    Knee surgery  2/2013    Other surgical history      Ear Surgery, Right ear after contusion    Other surgical history  2013    cyst buttock      Family History   Problem Relation Age of Onset    Lipids Father     Heart Disorder Maternal Grandfather         MI 68    Heart Attack Maternal Grandfather         age 65    Other (Other) Sister         obese, cocaine overdose    Heart Disorder Brother         MI    Other (Other) Brother         colon polyp      Social History:  Social History     Socioeconomic History    Marital status:    Tobacco Use    Smoking status: Former      Current packs/day: 0.00     Types: Cigarettes     Quit date: 1999     Years since quittin.3    Smokeless tobacco: Former     Quit date: 2019    Tobacco comments:     quit on 2019  when taking antibiotics for 3 months   Vaping Use    Vaping status: Never Used   Substance and Sexual Activity    Alcohol use: No    Drug use: No    Sexual activity: Yes     Partners: Female   Other Topics Concern    Caffeine Concern No     Comment: 2 coffee    Exercise Yes     Comment: active    Seat Belt Yes      Occ: physical work : yes Children: yes   Exercise: walking.  Diet: watches calories closely     REVIEW OF SYSTEMS:   GENERAL: feels well otherwise  SKIN: denies any unusual skin lesions  EYES:denies blurred vision or double vision  HEENT: denies nasal congestion, sinus pain or ST  LUNGS: denies shortness of breath with exertion  CARDIOVASCULAR: denies chest pain on exertion  GI: denies abdominal pain,denies heartburn  : denies nocturia or changes in stream  MUSCULOSKELETAL: chronic low right-sided back pain  NEURO: denies headaches  PSYCHE: denies depression or anxiety  HEMATOLOGIC: denies hx of anemia  ENDOCRINE: denies thyroid history  ALL/ASTHMA: denies hx of allergy or asthma    EXAM:   /80 (BP Location: Left arm, Patient Position: Sitting, Cuff Size: adult)   Pulse 58   Temp 96.6 °F (35.9 °C) (Temporal)   Resp 14   Ht 6' 1\" (1.854 m)   Wt 188 lb (85.3 kg)   BMI 24.80 kg/m²   Body mass index is 24.8 kg/m².   GENERAL: well developed, well nourished,in no apparent distress  SKIN: no rashes,no suspicious lesions  HEENT: atraumatic, normocephalic,ears and throat are clear  EYES:PERRLA, EOMI, conjunctiva are clear  NECK: supple,no adenopathy,  CHEST: no chest tenderness  BREAST: no dominant or suspicious mass  LUNGS: clear to auscultation  CARDIO: RRR without murmur  GI: good BS's,no masses, HSM or tenderness  : two descended testes,no masses,no hernia,no penile lesions  RECTAL: good  rectal tone, prostate mildly enlarged , shows no masses,   MUSCULOSKELETAL: back is not tender,FROM of the back  EXTREMITIES: no cyanosis, clubbing or edema  NEURO: Oriented times three,cranial nerves are intact,motor and sensory are grossly intact    Results for orders placed or performed in visit on 12/10/24   Lipid Panel    Collection Time: 12/10/24 10:05 AM   Result Value Ref Range    Cholesterol, Total 147 <200 mg/dL    HDL Cholesterol 63 (H) 40 - 59 mg/dL    Triglycerides 54 30 - 149 mg/dL    LDL Cholesterol 72 <100 mg/dL    VLDL 8 0 - 30 mg/dL    Non HDL Chol 84 <130 mg/dL    Patient Fasting for Lipid? Yes    CBC With Differential With Platelet    Collection Time: 12/10/24 10:05 AM   Result Value Ref Range    WBC 6.9 4.0 - 11.0 x10(3) uL    RBC 5.42 4.30 - 5.70 x10(6)uL    HGB 16.4 13.0 - 17.5 g/dL    HCT 48.5 39.0 - 53.0 %    .0 150.0 - 450.0 10(3)uL    MCV 89.5 80.0 - 100.0 fL    MCH 30.3 26.0 - 34.0 pg    MCHC 33.8 31.0 - 37.0 g/dL    RDW 13.2 %    Neutrophil Absolute Prelim 3.62 1.50 - 7.70 x10 (3) uL    Neutrophil Absolute 3.62 1.50 - 7.70 x10(3) uL    Lymphocyte Absolute 2.49 1.00 - 4.00 x10(3) uL    Monocyte Absolute 0.51 0.10 - 1.00 x10(3) uL    Eosinophil Absolute 0.18 0.00 - 0.70 x10(3) uL    Basophil Absolute 0.09 0.00 - 0.20 x10(3) uL    Immature Granulocyte Absolute 0.01 0.00 - 1.00 x10(3) uL    Neutrophil % 52.5 %    Lymphocyte % 36.1 %    Monocyte % 7.4 %    Eosinophil % 2.6 %    Basophil % 1.3 %    Immature Granulocyte % 0.1 %   Comp Metabolic Panel (14)    Collection Time: 12/10/24 10:05 AM   Result Value Ref Range    Glucose 98 70 - 99 mg/dL    Sodium 141 136 - 145 mmol/L    Potassium 4.8 3.5 - 5.1 mmol/L    Chloride 107 98 - 112 mmol/L    CO2 28.0 21.0 - 32.0 mmol/L    Anion Gap 6 0 - 18 mmol/L    BUN 17 9 - 23 mg/dL    Creatinine 0.84 0.70 - 1.30 mg/dL    Calcium, Total 10.0 8.7 - 10.4 mg/dL    Calculated Osmolality 294 275 - 295 mOsm/kg    eGFR-Cr 97 >=60 mL/min/1.73m2    AST 35  (H) <34 U/L    ALT 24 10 - 49 U/L    Alkaline Phosphatase 85 45 - 117 U/L    Bilirubin, Total 2.0 (H) 0.2 - 1.1 mg/dL    Total Protein 8.2 5.7 - 8.2 g/dL    Albumin 4.9 (H) 3.2 - 4.8 g/dL    Globulin  3.3 2.0 - 3.5 g/dL    A/G Ratio 1.5 1.0 - 2.0    Patient Fasting for CMP? Yes    Urinalysis with Culture Reflex    Collection Time: 12/10/24 10:05 AM    Specimen: Urine, clean catch   Result Value Ref Range    Urine Color Light-Yellow Yellow    Clarity Urine Clear Clear    Spec Gravity 1.021 1.005 - 1.030    Glucose Urine Normal Normal mg/dL    Bilirubin Urine Negative Negative    Ketones Urine Negative Negative mg/dL    Blood Urine Negative Negative    pH Urine 6.0 5.0 - 8.0    Protein Urine Negative Negative mg/dL    Urobilinogen Urine Normal Normal mg/dL    Nitrite Urine Negative Negative    Leukocyte Esterase Urine Negative Negative    Microscopic Microscopic not indicated    PSA Total, Screen    Collection Time: 12/10/24 10:05 AM   Result Value Ref Range    Prostate Specific Antigen Screen  1.02 <=4.00 ng/mL     ASSESSMENT AND PLAN:   Antonio Galeana is a 64 year old male who presents for a complete physical exam.    Encounter Diagnoses   Name Primary?    Physical exam, annual Yes    Hypercholesterolemia        Orders Placed This Encounter   Procedures    Comp Metabolic Panel (14)    Lipid Panel       Meds & Refills for this Visit:  Requested Prescriptions      No prescriptions requested or ordered in this encounter   Healthy diet.  Stay active.   Do fasting blood work prior visit in April.  Schedule Medicare visit for April 2025.    Imaging & Consults:  None  Pt's weight is Body mass index is 24.8 kg/m²., recommended low carb diet and aerobic exercise 30 minutes three times weekly. Health maintenance, will check fasting Lipids, CMP, CBC and PSA. Pt is UTD with screening colonoscopy. The patient indicates understanding of these issues and agrees to the plan.  The patient is asked to return for CPX in 1 year.  Med  check April 2025.

## 2025-01-07 ENCOUNTER — APPOINTMENT (OUTPATIENT)
Dept: URBAN - METROPOLITAN AREA CLINIC 249 | Age: 65
Setting detail: DERMATOLOGY
End: 2025-01-08

## 2025-01-07 DIAGNOSIS — L73.8 OTHER SPECIFIED FOLLICULAR DISORDERS: ICD-10-CM

## 2025-01-07 DIAGNOSIS — L72.0 EPIDERMAL CYST: ICD-10-CM

## 2025-01-07 DIAGNOSIS — L82.0 INFLAMED SEBORRHEIC KERATOSIS: ICD-10-CM

## 2025-01-07 PROCEDURE — OTHER ELECTRODESICCATION (COSMETIC): OTHER

## 2025-01-07 PROCEDURE — 17110 DESTRUCT B9 LESION 1-14: CPT

## 2025-01-07 PROCEDURE — OTHER SUTURE REMOVAL (GLOBAL PERIOD): OTHER

## 2025-01-07 PROCEDURE — OTHER LIQUID NITROGEN: OTHER

## 2025-01-07 ASSESSMENT — LOCATION ZONE DERM
LOCATION ZONE: TRUNK
LOCATION ZONE: FACE

## 2025-01-07 ASSESSMENT — LOCATION DETAILED DESCRIPTION DERM
LOCATION DETAILED: LEFT SUPERIOR FOREHEAD
LOCATION DETAILED: LEFT FOREHEAD
LOCATION DETAILED: RIGHT SUPERIOR MEDIAL MALAR CHEEK
LOCATION DETAILED: INFERIOR MID FOREHEAD
LOCATION DETAILED: RIGHT FOREHEAD
LOCATION DETAILED: LEFT CENTRAL EYEBROW
LOCATION DETAILED: LEFT CENTRAL MALAR CHEEK
LOCATION DETAILED: LEFT SUPERIOR MEDIAL MALAR CHEEK
LOCATION DETAILED: SUPERIOR LUMBAR SPINE
LOCATION DETAILED: LEFT MEDIAL FOREHEAD

## 2025-01-07 ASSESSMENT — LOCATION SIMPLE DESCRIPTION DERM
LOCATION SIMPLE: LOWER BACK
LOCATION SIMPLE: RIGHT CHEEK
LOCATION SIMPLE: LEFT FOREHEAD
LOCATION SIMPLE: LEFT EYEBROW
LOCATION SIMPLE: LEFT CHEEK
LOCATION SIMPLE: INFERIOR FOREHEAD
LOCATION SIMPLE: RIGHT FOREHEAD

## 2025-01-07 NOTE — PROCEDURE: ELECTRODESICCATION (COSMETIC)
Anesthesia Type: 1% lidocaine with epinephrine
Detail Level: Zone
Consent: The patient's consent was obtained including but not limited to risks of crusting, scabbing, blistering, scarring, darker or lighter pigmentary change, recurrence, incomplete removal and infection.
Post-Care Instructions: I reviewed with the patient in detail post-care instructions. Patient is to wear sunprotection, and avoid picking at any of the treated lesions. Pt may apply Vaseline to crusted or scabbing areas
Price (Use Numbers Only, No Special Characters Or $): 150

## 2025-01-07 NOTE — PROCEDURE: LIQUID NITROGEN
Include Z78.9 (Other Specified Conditions Influencing Health Status) As An Associated Diagnosis?: No
Show Spray Paint Technique Variable?: Yes
Consent: The patient's consent was obtained including but not limited to risks of crusting, scabbing, blistering, scarring, darker or lighter pigmentary change, recurrence, incomplete removal and infection.
Detail Level: Detailed
Medical Necessity Clause: This procedure was medically necessary because the lesions that were treated were:
Post-Care Instructions: I reviewed with the patient in detail post-care instructions. Patient is to wear sunprotection, and avoid picking at any of the treated lesions. Pt may apply Vaseline to crusted or scabbing areas.
Medical Necessity Information: It is in your best interest to select a reason for this procedure from the list below. All of these items fulfill various CMS LCD requirements except the new and changing color options.
Spray Paint Text: The liquid nitrogen was applied to the skin utilizing a spray paint frosting technique.

## 2025-01-07 NOTE — PROCEDURE: SUTURE REMOVAL (GLOBAL PERIOD)
Detail Level: Detailed
Add 09938 Cpt? (Important Note: In 2017 The Use Of 52318 Is Being Tracked By Cms To Determine Future Global Period Reimbursement For Global Periods): no

## 2025-03-25 DIAGNOSIS — N52.9 ERECTILE DYSFUNCTION, UNSPECIFIED ERECTILE DYSFUNCTION TYPE: ICD-10-CM

## 2025-03-26 RX ORDER — CELECOXIB 200 MG/1
200 CAPSULE ORAL DAILY PRN
Qty: 30 CAPSULE | Refills: 0 | OUTPATIENT
Start: 2025-03-26

## 2025-03-26 RX ORDER — SILDENAFIL 50 MG/1
50 TABLET, FILM COATED ORAL
Qty: 30 TABLET | Refills: 0 | Status: SHIPPED | OUTPATIENT
Start: 2025-03-26

## 2025-03-26 NOTE — TELEPHONE ENCOUNTER
LOV:12/19/2025   for: CPX  Patient advised to RTC on:  April 2025 for Medicare visit.  Nov:04/18/2025    Medication Quantity Refills Start End   Sildenafil Citrate 50 MG Oral Tab 30 tablet 0 10/21/2024 --   Sig:   Take 1 tablet (50 mg total) by mouth daily as needed for Erectile Dysfunction.

## 2025-04-08 ENCOUNTER — LAB ENCOUNTER (OUTPATIENT)
Dept: LAB | Age: 65
End: 2025-04-08
Attending: FAMILY MEDICINE
Payer: MEDICARE

## 2025-04-08 DIAGNOSIS — E78.00 HYPERCHOLESTEROLEMIA: ICD-10-CM

## 2025-04-08 LAB
ALBUMIN SERPL-MCNC: 5.1 G/DL (ref 3.2–4.8)
ALBUMIN/GLOB SERPL: 2 {RATIO} (ref 1–2)
ALP LIVER SERPL-CCNC: 82 U/L
ALT SERPL-CCNC: 21 U/L
ANION GAP SERPL CALC-SCNC: 9 MMOL/L (ref 0–18)
AST SERPL-CCNC: 29 U/L (ref ?–34)
BILIRUB SERPL-MCNC: 2.3 MG/DL (ref 0.2–1.1)
BUN BLD-MCNC: 15 MG/DL (ref 9–23)
CALCIUM BLD-MCNC: 10.3 MG/DL (ref 8.7–10.6)
CHLORIDE SERPL-SCNC: 102 MMOL/L (ref 98–112)
CHOLEST SERPL-MCNC: 166 MG/DL (ref ?–200)
CO2 SERPL-SCNC: 30 MMOL/L (ref 21–32)
CREAT BLD-MCNC: 0.91 MG/DL
EGFRCR SERPLBLD CKD-EPI 2021: 94 ML/MIN/1.73M2 (ref 60–?)
FASTING PATIENT LIPID ANSWER: YES
FASTING STATUS PATIENT QL REPORTED: YES
GLOBULIN PLAS-MCNC: 2.6 G/DL (ref 2–3.5)
GLUCOSE BLD-MCNC: 97 MG/DL (ref 70–99)
HDLC SERPL-MCNC: 56 MG/DL (ref 40–59)
LDLC SERPL CALC-MCNC: 96 MG/DL (ref ?–100)
NONHDLC SERPL-MCNC: 110 MG/DL (ref ?–130)
OSMOLALITY SERPL CALC.SUM OF ELEC: 293 MOSM/KG (ref 275–295)
POTASSIUM SERPL-SCNC: 4.4 MMOL/L (ref 3.5–5.1)
PROT SERPL-MCNC: 7.7 G/DL (ref 5.7–8.2)
SODIUM SERPL-SCNC: 141 MMOL/L (ref 136–145)
TRIGL SERPL-MCNC: 74 MG/DL (ref 30–149)
VLDLC SERPL CALC-MCNC: 12 MG/DL (ref 0–30)

## 2025-04-08 PROCEDURE — 36415 COLL VENOUS BLD VENIPUNCTURE: CPT

## 2025-04-08 PROCEDURE — 80061 LIPID PANEL: CPT

## 2025-04-08 PROCEDURE — 80053 COMPREHEN METABOLIC PANEL: CPT

## 2025-04-18 ENCOUNTER — OFFICE VISIT (OUTPATIENT)
Dept: FAMILY MEDICINE CLINIC | Facility: CLINIC | Age: 65
End: 2025-04-18
Payer: COMMERCIAL

## 2025-04-18 VITALS
HEART RATE: 64 BPM | DIASTOLIC BLOOD PRESSURE: 88 MMHG | TEMPERATURE: 97 F | RESPIRATION RATE: 18 BRPM | WEIGHT: 184 LBS | SYSTOLIC BLOOD PRESSURE: 138 MMHG | BODY MASS INDEX: 24.39 KG/M2 | HEIGHT: 73 IN

## 2025-04-18 DIAGNOSIS — E78.00 HYPERCHOLESTEROLEMIA: ICD-10-CM

## 2025-04-18 DIAGNOSIS — Z12.5 SCREENING FOR PROSTATE CANCER: ICD-10-CM

## 2025-04-18 DIAGNOSIS — N52.9 ERECTILE DYSFUNCTION, UNSPECIFIED ERECTILE DYSFUNCTION TYPE: ICD-10-CM

## 2025-04-18 DIAGNOSIS — I25.10 ATHEROSCLEROSIS OF NATIVE CORONARY ARTERY OF NATIVE HEART WITHOUT ANGINA PECTORIS: ICD-10-CM

## 2025-04-18 DIAGNOSIS — R93.1 ABNORMAL CT SCAN OF HEART: ICD-10-CM

## 2025-04-18 DIAGNOSIS — Z00.00 ENCOUNTER FOR ANNUAL HEALTH EXAMINATION: Primary | ICD-10-CM

## 2025-04-18 DIAGNOSIS — E06.3 HASHIMOTO'S THYROIDITIS: ICD-10-CM

## 2025-04-18 DIAGNOSIS — E04.2 MULTIPLE THYROID NODULES: ICD-10-CM

## 2025-04-18 RX ORDER — SILDENAFIL 50 MG/1
50 TABLET, FILM COATED ORAL
Qty: 30 TABLET | Refills: 0 | Status: SHIPPED | OUTPATIENT
Start: 2025-04-18

## 2025-04-18 RX ORDER — ROSUVASTATIN CALCIUM 5 MG/1
5 TABLET, COATED ORAL NIGHTLY
Qty: 90 TABLET | Refills: 1 | Status: SHIPPED | OUTPATIENT
Start: 2025-04-18

## 2025-04-18 RX ORDER — LEVOTHYROXINE SODIUM 100 UG/1
100 TABLET ORAL DAILY
Qty: 90 TABLET | Refills: 1 | Status: SHIPPED | OUTPATIENT
Start: 2025-04-18 | End: 2026-04-13

## 2025-04-18 NOTE — PROGRESS NOTES
Subjective:   Antonio Galeana is a 65 year old male who presents for a MA AHA (Medicare Advantage Annual Health Assessment) and Medicare Initial Preventative Physical Exam (Welcome to Medicare- < 12 months on Medicare) and scheduled follow up of multiple significant but stable problems.       The following individual(s) verbally consented to be recorded using ambient AI listening technology and understand that they can each withdraw their consent to this listening technology at any point by asking the clinician to turn off or pause the recording:    Patient name: Antonio Galeana    History of Present Illness  He is a 65-year-old male here for a Welcome to Medicare visit also follow-up on hypertension, hypercholesterolemia ED, elevated bilirubin, multiple thyroid nodules.    His blood pressure was elevated today, which he attributes to recent travel stress and alcohol consumption during a trip to [facility]. His usual blood pressure readings are around 110/70 to 120/80 mmHg.    He is currently taking rosuvastatin 5 mg daily for cholesterol management and reports good control of his cholesterol levels. He occasionally misses a dose but maintains a low-cholesterol diet. Recent blood work showed good cholesterol levels.  Refill was called in.    He is on levothyroxine for thyroid management. He had blood work done approximately a week ago, and he plans to have further thyroid evaluations, including a potential ultrasound for thyroid nodules, as suggested by another physician.  Patient is seeing ENT Dr. Solis for thyroid nodules.    He mentions a history of elevated bilirubin levels and suspects he may have Gilbert syndrome, as his son has been diagnosed with it. He avoids fasting for extended periods.    He uses Viagra as needed and finds it effective.  It helps with JEANNE.    He recently returned from a trip to [facility], where he indulged in alcohol despite not being a regular drinker, leading to a current hangover. He  plans to abstain from alcohol moving forward.    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 He is nearing [employer].    He needs reading glasses and had an eye examination a month ago, which was normal except for the need for reading glasses. No significant changes in hearing. He feels that his nerves are 'a little dense or dead' today, possibly due to travel and lack of sleep.    Patient had abnormal CT heart scan score.  298.  He is taking rosuvastatin regularly which is helping.  Monitor heart scan regularly.     History/Other:   Fall Risk Assessment:   He has been screened for Falls and is low risk.      Cognitive Assessment:   He had a completely normal cognitive assessment - see flowsheet entries     Functional Ability/Status:   Antonio Galeana has some abnormal functions as listed below:  He has difficulties Affording Meds based on screening of functional status.       Depression Screening (PHQ):  PHQ-2 SCORE: 0  , done 4/18/2025   If you checked off any problems, how difficult have these problems made it for you to do your work, take care of things at home, or get along with other people?: Not difficult at all           Advanced Directives:   He does have a Living Will but we do NOT have it on file in Epic.    He does NOT have a Power of  for Health Care. [Do you have a healthcare power of ?: No]  Discussed Advance Care Planning with patient (and family/surrogate if present). Standard forms made available to patient in After Visit Summary.      Patient Active Problem List   Diagnosis    Other abnormal blood chemistry    Disorders of bilirubin excretion    Pure hypercholesterolemia    Nonspecific abnormal electrocardiogram (ECG) (EKG)    Laboratory examination ordered as part of a routine general medical examination    Tear of medial cartilage or meniscus of knee, current    Screening for other and unspecified cardiovascular  conditions    Osteoarthrosis, unspecified whether generalized or localized, lower leg    Tear of lateral cartilage or meniscus of knee, current    Unspecified internal derangement of knee    Plantar fascial fibromatosis    Arthritis, septic (HCC)    Arthritis of right knee due to other bacteria (HCC)    Drug-induced urticaria    Swelling of joint, knee, right    Acute pain of right knee    Pyogenic arthritis, lower leg (HCC)    Septic arthritis of knee, right (HCC)    Urinary retention    Diarrhea    Benign neoplasm of ascending colon    Bronchiectasis without complication (HCC)    Elevated TSH    Bradycardia    Atherosclerosis of native coronary artery of native heart without angina pectoris    Hypercholesterolemia    Abnormal CT scan of heart     Allergies:  He is allergic to ceftriaxone.    Current Medications:  Outpatient Medications Marked as Taking for the 4/18/25 encounter (Office Visit) with Ann Ace MD   Medication Sig    rosuvastatin 5 MG Oral Tab Take 1 tablet (5 mg total) by mouth nightly.    levothyroxine 100 MCG Oral Tab Take 1 tablet (100 mcg total) by mouth daily.    Sildenafil Citrate 50 MG Oral Tab Take 1 tablet (50 mg total) by mouth daily as needed for Erectile Dysfunction.       Medical History:  He  has a past medical history of Abdominal pain (08/15/61559), Back pain (12/12/2019), Diarrhea, unspecified (12/12/2019), Food intolerance (03/17/2021), Heartburn (12/12/19), Hemorrhoids (01/01/1990), Indigestion (12/12/19), Irregular bowel habits (03/30/2021), Nausea (8/15/2019), Sleep disturbance (12/12/2019), Weight gain (8/10/2019), and Weight loss (03/20/2021).  Surgical History:  He  has a past surgical history that includes hand/finger surgery unlisted; knee surgery (2/2013); other surgical history; other surgical history (2013); colonoscopy (11/27/2013); and colonoscopy (2013).   Family History:  His family history includes Heart Attack in his maternal grandfather; Heart Disorder in  his brother and maternal grandfather; Lipids in his father; Other in his brother and sister.  Social History:  He  reports that he quit smoking about 25 years ago. His smoking use included cigarettes. He quit smokeless tobacco use about 5 years ago. He reports that he does not drink alcohol and does not use drugs.    Tobacco:  He smoked tobacco in the past but quit greater than 12 months ago.  Tobacco Use[1]     CAGE Alcohol Screen:   CAGE screening score of 0 on 4/18/2025, showing low risk of alcohol abuse.      Patient Care Team:  Ann Ace MD as PCP - General (Family Medicine)  Nick Hawk, PT, DPT, OCS, Cert MDT  as Physical Therapist (Physical Therapy)  Joseph Wright DO (GASTROENTEROLOGY)    Review of Systems  GENERAL: feels well otherwise  SKIN: denies any unusual skin lesions  EYES: denies blurred vision or double vision  HEENT: denies nasal congestion, sinus pain or ST  LUNGS: denies shortness of breath with exertion  CARDIOVASCULAR: denies chest pain on exertion  GI: denies abdominal pain, denies heartburn  : 1 per night nocturia, no complaint of urinary incontinence  MUSCULOSKELETAL: denies back pain  NEURO: denies headaches  PSYCHE: denies depression or anxiety  HEMATOLOGIC: denies hx of anemia  ENDOCRINE: denies thyroid history  ALL/ASTHMA: denies hx of allergy or asthma    Objective:   Physical Exam  General Appearance:  Alert, cooperative, no distress, appears stated age   Head:  Normocephalic, without obvious abnormality, atraumatic   Eyes:  PERRL, conjunctiva/corneas clear, EOM's intact, both eyes   Ears:  Normal TM's and external ear canals, both ears   Nose: Nares normal, septum midline, mucosa normal, no drainage or sinus tenderness   Throat: Lips, mucosa, and tongue normal; teeth and gums normal   Neck: Supple, symmetrical, trachea midline, no adenopathy, thyroid: not enlarged, symmetric, no tenderness/mass/nodules, no carotid bruit or JVD   Back:   Symmetric, no  curvature, ROM normal, no CVA tenderness   Lungs:   Clear to auscultation bilaterally, respirations unlabored   Chest Wall:  No tenderness or deformity   Heart:  Regular rate and rhythm, S1, S2 normal, no murmur, rub or gallop   Abdomen:   Soft, non-tender, bowel sounds active all four quadrants,  no masses, no organomegaly   Genitalia:  deferred by patient.   last examination December 2024.   Rectal: Deferred by patient last examination done December 2024.   Extremities: Extremities normal, atraumatic, no cyanosis or edema   Pulses: 2+ and symmetric   Skin: Skin color, texture, turgor normal, no rashes or lesions   Lymph nodes: Cervical, supraclavicular, and axillary nodes normal   Neurologic: Normal     /88 (BP Location: Left arm, Patient Position: Sitting, Cuff Size: adult)   Pulse 64   Temp 97.2 °F (36.2 °C) (Temporal)   Resp 18   Ht 6' 1\" (1.854 m)   Wt 184 lb (83.5 kg)   BMI 24.28 kg/m²  Estimated body mass index is 24.28 kg/m² as calculated from the following:    Height as of this encounter: 6' 1\" (1.854 m).    Weight as of this encounter: 184 lb (83.5 kg).    Medicare Hearing Assessment:   Hearing Screening    Time taken: 4/18/2025 10:37 AM  Screening Method: Finger Rub  Finger Rub Result: Pass         Visual Acuity:   Right Eye Visual Acuity: Uncorrected Right Eye Chart Acuity: 20/50   Left Eye Visual Acuity: Uncorrected Left Eye Chart Acuity: 20/70   Both Eyes Visual Acuity: Uncorrected Both Eyes Chart Acuity: 20/30   Able To Tolerate Visual Acuity: Yes      Results for orders placed or performed in visit on 04/08/25   Lipid Panel    Collection Time: 04/08/25 12:43 PM   Result Value Ref Range    Cholesterol, Total 166 <200 mg/dL    HDL Cholesterol 56 40 - 59 mg/dL    Triglycerides 74 30 - 149 mg/dL    LDL Cholesterol 96 <100 mg/dL    VLDL 12 0 - 30 mg/dL    Non HDL Chol 110 <130 mg/dL    Patient Fasting for Lipid? Yes    Comp Metabolic Panel (14)    Collection Time: 04/08/25 12:43 PM   Result  Value Ref Range    Glucose 97 70 - 99 mg/dL    Sodium 141 136 - 145 mmol/L    Potassium 4.4 3.5 - 5.1 mmol/L    Chloride 102 98 - 112 mmol/L    CO2 30.0 21.0 - 32.0 mmol/L    Anion Gap 9 0 - 18 mmol/L    BUN 15 9 - 23 mg/dL    Creatinine 0.91 0.70 - 1.30 mg/dL    Calcium, Total 10.3 8.7 - 10.6 mg/dL    Calculated Osmolality 293 275 - 295 mOsm/kg    eGFR-Cr 94 >=60 mL/min/1.73m2    AST 29 <34 U/L    ALT 21 10 - 49 U/L    Alkaline Phosphatase 82 45 - 117 U/L    Bilirubin, Total 2.3 (H) 0.2 - 1.1 mg/dL    Total Protein 7.7 5.7 - 8.2 g/dL    Albumin 5.1 (H) 3.2 - 4.8 g/dL    Globulin  2.6 2.0 - 3.5 g/dL    A/G Ratio 2.0 1.0 - 2.0    Patient Fasting for CMP? Yes       Assessment & Plan:   Antonio Galeana is a 65 year old male who presents for a Medicare Assessment.     1. Encounter for annual health examination (Primary)  2. Hypercholesterolemia  -     Comp Metabolic Panel (14); Future; Expected date: 09/22/2025  -     Lipid Panel; Future; Expected date: 09/22/2025  -     Rosuvastatin Calcium; Take 1 tablet (5 mg total) by mouth nightly.  Dispense: 90 tablet; Refill: 1  3. Hashimoto's thyroiditis  -     TSH and Free T4; Future; Expected date: 09/22/2025  -     Levothyroxine Sodium; Take 1 tablet (100 mcg total) by mouth daily.  Dispense: 90 tablet; Refill: 1  4. Screening for prostate cancer  5. Erectile dysfunction, unspecified erectile dysfunction type  -     Sildenafil Citrate; Take 1 tablet (50 mg total) by mouth daily as needed for Erectile Dysfunction.  Dispense: 30 tablet; Refill: 0  6. Atherosclerosis of native coronary artery of native heart without angina pectoris  7. Abnormal CT scan of heart  8. Multiple thyroid nodules    Assessment & Plan  General Health Maintenance  Up to date with vaccinations except pneumonia. Colonoscopy due 2028. Prostate exam deferred.  - Administer Prevnar 20 at next visit.  - Schedule colonoscopy for April 2028.  - Defer prostate exam until next year.    Elevated blood  pressure  Elevated blood pressure likely due to travel stress and alcohol. Typically well-controlled.  - Recheck blood pressure during visit.    Hypothyroidism  On levothyroxine 100 mcg daily. Thyroid function tests and ultrasound for nodules recommended.  - Continue levothyroxine 100 mcg orally daily.  - Check thyroid function tests in October.  - Schedule thyroid ultrasound.    Hypercholesterolemia  Cholesterol well-controlled with rosuvastatin 5 mg daily.  - Continue rosuvastatin 5 mg orally nightly.  - Check cholesterol levels in October.    Gilbert Syndrome  Elevated bilirubin due to Gilbert syndrome. No treatment required.    Erectile Dysfunction  Sildenafil citrate 50 mg effective as needed.  - Continue sildenafil citrate 50 mg orally as needed.    Abnormal heart scan score  - Patient is asymptomatic monitor periodically healthy well-balanced diet.      Follow-up  Routine follow-up and monitoring of chronic conditions planned.  - Schedule follow-up in October before 18th.  - Perform blood work including cholesterol, electrolytes, and thyroid function tests in late September or early October.    Continue current meds.   Watch diet for fats and carbs.   Stay active.    The patient indicates understanding of these issues and agrees to the plan.  Imaging studies ordered.  Lab work ordered.  Reinforced healthy diet, lifestyle, and exercise.  Continue current meds.   Watch diet for fats and carbs.   Stay active.      VISIT SUMMARY:  You had a Welcome to Medicare visit today. We discussed your recent travel and its impact on your blood pressure, your current medications, and your overall health maintenance. We also reviewed your recent blood work and planned future evaluations for your thyroid and cholesterol levels.    YOUR PLAN:  - Elevated blood pressure : Your blood pressure was elevated today, likely due to travel stress and alcohol consumption. Hypertension means high blood pressure. We will recheck your blood  pressure during this visit to ensure it returns to normal levels.    -HYPOTHYROIDISM: You have an underactive thyroid, which is being managed with levothyroxine. We will continue your current dose and check your thyroid function tests in October. Additionally, we will schedule an ultrasound to evaluate your thyroid nodules.    -HYPERCHOLESTEROLEMIA: Your cholesterol levels are well-controlled with your current medication, rosuvastatin. Hypercholesterolemia means high cholesterol. We will continue your current dose and check your cholesterol levels again in October.    -GILBERT SYNDROME: You have elevated bilirubin levels likely due to Gilbert syndrome, a mild liver disorder. No treatment is required for this condition.    -ERECTILE DYSFUNCTION: You are using sildenafil citrate (Viagra) as needed, which is effective for you. Erectile dysfunction means difficulty in getting or maintaining an erection. Continue using the medication as needed.    Multiple thyroid nodules follow-up with  ENT doctor .    -GENERAL HEALTH MAINTENANCE: You are up to date with your vaccinations except for pneumonia. We will administer the Prevnar 20 vaccine at your next visit. Your next colonoscopy is due in April 2028, and we will defer your prostate exam until next year.    INSTRUCTIONS:  Please schedule a follow-up appointment in October before the 18th. We will perform blood work, including cholesterol, electrolytes, and thyroid function tests, in late September or early October. Additionally, schedule a thyroid ultrasound as recommended.    Contains text generated by Sadaf     No follow-ups on file.     Ann Ace MD, 4/18/2025     Supplementary Documentation:   General Health:  In the past six months, have you lost more than 10 pounds without trying?: 2 - No  Has your appetite been poor?: No  Type of Diet: Balanced  How does the patient maintain a good energy level?: Daily Walks, Appropriate Exercise  How would you describe  your daily physical activity?: Moderate  How would you describe your current health state?: Good  How do you maintain positive mental well-being?: Visiting Family  On a scale of 0 to 10, with 0 being no pain and 10 being severe pain, what is your pain level?: 0 - (None)  In the past six months, have you experienced urine leakage?: 0-No  At any time do you feel concerned for the safety/well-being of yourself and/or your children, in your home or elsewhere?: No  Have you had any immunizations at another office such as Influenza, Hepatitis B, Tetanus, or Pneumococcal?: No    Health Maintenance   Topic Date Due    Pneumococcal Vaccine: 50+ Years (1 of 2 - PCV) Never done    COVID-19 Vaccine (2024- season) 2024    Annual Well Visit  Never done    Annual Depression Screening  2025    Fall Risk Screening (Annual)  2025    PSA  12/10/2026    Colorectal Cancer Screening  2028    Influenza Vaccine  Completed    Zoster Vaccines  Completed    Meningococcal B Vaccine  Aged Out          [1]   Social History  Tobacco Use   Smoking Status Former    Current packs/day: 0.00    Types: Cigarettes    Quit date: 1999    Years since quittin.7   Smokeless Tobacco Former    Quit date: 2019   Tobacco Comments    quit on 2019  when taking antibiotics for 3 months

## 2025-04-18 NOTE — PATIENT INSTRUCTIONS
Continue current meds.   Watch diet for fats and carbs.   Stay active.      VISIT SUMMARY:  You had a Welcome to Medicare visit today. We discussed your recent travel and its impact on your blood pressure, your current medications, and your overall health maintenance. We also reviewed your recent blood work and planned future evaluations for your thyroid and cholesterol levels.    YOUR PLAN:  - Elevated blood pressure : Your blood pressure was elevated today, likely due to travel stress and alcohol consumption. Hypertension means high blood pressure. We will recheck your blood pressure during this visit to ensure it returns to normal levels.    -HYPOTHYROIDISM: You have an underactive thyroid, which is being managed with levothyroxine. We will continue your current dose and check your thyroid function tests in October. Additionally, we will schedule an ultrasound to evaluate your thyroid nodules.    -HYPERCHOLESTEROLEMIA: Your cholesterol levels are well-controlled with your current medication, rosuvastatin. Hypercholesterolemia means high cholesterol. We will continue your current dose and check your cholesterol levels again in October.    -GILBERT SYNDROME: You have elevated bilirubin levels likely due to Gilbert syndrome, a mild liver disorder. No treatment is required for this condition.    -ERECTILE DYSFUNCTION: You are using sildenafil citrate (Viagra) as needed, which is effective for you. Erectile dysfunction means difficulty in getting or maintaining an erection. Continue using the medication as needed.    Multiple thyroid nodules follow-up with  ENT doctor .    -GENERAL HEALTH MAINTENANCE: You are up to date with your vaccinations except for pneumonia. We will administer the Prevnar 20 vaccine at your next visit. Your next colonoscopy is due in April 2028, and we will defer your prostate exam until next year.    INSTRUCTIONS:  Please schedule a follow-up appointment in October before the 18th. We will  perform blood work, including cholesterol, electrolytes, and thyroid function tests, in late September or early October. Additionally, schedule a thyroid ultrasound as recommended.    Contains text generated by Sadaf

## 2025-04-21 ENCOUNTER — TELEPHONE (OUTPATIENT)
Facility: LOCATION | Age: 65
End: 2025-04-21

## 2025-04-21 DIAGNOSIS — E04.9 NODULAR GOITER: Primary | ICD-10-CM

## 2025-04-21 NOTE — TELEPHONE ENCOUNTER
Ordering thyroid ultrasound seeing the patient in early May we will go over results.  This would represent a slightly sooner ultrasound but he feels as though he has symptoms

## 2025-04-23 ENCOUNTER — HOSPITAL ENCOUNTER (OUTPATIENT)
Dept: ULTRASOUND IMAGING | Facility: HOSPITAL | Age: 65
Discharge: HOME | End: 2025-04-23
Attending: OTOLARYNGOLOGY
Payer: MEDICARE

## 2025-04-23 DIAGNOSIS — E04.9 NODULAR GOITER: ICD-10-CM

## 2025-04-23 PROCEDURE — 76536 US EXAM OF HEAD AND NECK: CPT | Performed by: OTOLARYNGOLOGY

## 2025-05-06 ENCOUNTER — OFFICE VISIT (OUTPATIENT)
Facility: LOCATION | Age: 65
End: 2025-05-06
Payer: COMMERCIAL

## 2025-05-06 DIAGNOSIS — E06.3 HASHIMOTO'S THYROIDITIS: Primary | ICD-10-CM

## 2025-05-06 PROCEDURE — 99214 OFFICE O/P EST MOD 30 MIN: CPT | Performed by: OTOLARYNGOLOGY

## 2025-05-06 NOTE — PROGRESS NOTES
Antonio Galeana is a 65 year old male. No chief complaint on file.    HPI:   65-year-old white male we have been following thyroid nodules and his Hashimoto's thyroiditis he is on 100 mcg of Synthroid.  He last had at least 2 nodules in the thyroid follow-up thyroid ultrasound which were going over today he has no nodules.  Current Medications[1]   Past Medical History[2]   Social History:  Short Social Hx on File[3]   Past Surgical History[4]      REVIEW OF SYSTEMS:   GENERAL HEALTH: feels well otherwise  GENERAL : denies fever, chills, sweats, weight loss, weight gain  SKIN: denies any unusual skin lesions or rashes  RESPIRATORY: denies shortness of breath with exertion  NEURO: denies headaches    EXAM:   There were no vitals taken for this visit.    System Pertinent findings Details   Constitutional  Overall appearance - Normal.   Head/Face  Facial features -- Normal. Skull - Normal.   Eyes  Pupils equal ,round ,react to light and accomidate   Ears  External Ear Right: Normal, Left: Normal. Canal - Right: Normal, Left: Normal. TM - Right: Normal left: Normal   Nose  External Nose, Normal, Septum -midline,Nasal Vault, clear. Turbinates - Right: Normal left: Normal   Mouth/Throat  Lips/teeth/gums - Normal. Tonsils -1+ oropharynx - Normal.   Neck Exam  Inspection - Normal. Palpation - Normal. Parotid gland - Normal. Thyroid gland -normal   Lymph Detail  Submental. Submandibular. Anterior cervical. Posterior cervical. Supraclavicular.   Thyroid ultrasound dated 4/23/2025  Previously noted thyroid nodules are no longer identified, there is a hypervascularity to the gland consistent with Hashimoto's thyroiditis    ASSESSMENT AND PLAN:   1. Hashimoto's thyroiditis  2-year follow-up thyroid ultrasound      The patient indicates understanding of these issues and agrees to the plan.      David Solis MD  5/6/2025  12:22 PM       [1]   Current Outpatient Medications   Medication Sig Dispense Refill     rosuvastatin 5 MG Oral Tab Take 1 tablet (5 mg total) by mouth nightly. 90 tablet 1    levothyroxine 100 MCG Oral Tab Take 1 tablet (100 mcg total) by mouth daily. 90 tablet 1    Sildenafil Citrate 50 MG Oral Tab Take 1 tablet (50 mg total) by mouth daily as needed for Erectile Dysfunction. 30 tablet 0    celecoxib 200 MG Oral Cap Take 1 capsule (200 mg total) by mouth daily as needed for Pain. 30 capsule 0   [2]   Past Medical History:   Abdominal pain    Back pain    weight Gain    Diarrhea, unspecified    Food intolerance    lost ability to process sugar, causes nausea, Diarrhea    Heartburn    also had heartburn 20 years ago when drinking alcohol    Hemorrhoids    Indigestion    Irregular bowel habits    movements 3 times before work, then nothing    Nausea    Sleep disturbance    stomach pain    Weight gain    quit smoking on this day.  Also had infection when quitting    Weight loss    trying to find what are my  friendly foods   [3]   Social History  Socioeconomic History    Marital status:    Tobacco Use    Smoking status: Former     Current packs/day: 0.00     Types: Cigarettes     Quit date: 1999     Years since quittin.7    Smokeless tobacco: Former     Quit date: 2019    Tobacco comments:     quit on 2019  when taking antibiotics for 3 months   Vaping Use    Vaping status: Never Used   Substance and Sexual Activity    Alcohol use: No    Drug use: No    Sexual activity: Yes     Partners: Female   Other Topics Concern    Caffeine Concern No     Comment: 2 coffee    Exercise Yes     Comment: active    Seat Belt Yes   [4]   Past Surgical History:  Procedure Laterality Date    Colonoscopy  2013    Procedure: COLONOSCOPY;  Surgeon: Ilda Pablo MD;  Location:  ENDOSCOPY    Colonoscopy      Hand/finger surgery unlisted      30 y ago right thumb surgery    Knee surgery  2013    Other surgical history      Ear Surgery, Right ear after contusion    Other surgical history   2013    cyst buttock

## 2025-05-20 RX ORDER — CELECOXIB 200 MG/1
200 CAPSULE ORAL DAILY PRN
Qty: 30 CAPSULE | Refills: 0 | Status: SHIPPED | OUTPATIENT
Start: 2025-05-20

## 2025-05-20 NOTE — TELEPHONE ENCOUNTER
Requested Prescriptions     Pending Prescriptions Disp Refills    celecoxib 200 MG Oral Cap 30 capsule 0     Sig: Take 1 capsule (200 mg total) by mouth daily as needed for Pain.       FILLED- 10/3/24  LOV- 5/6/25    No future appointments.

## 2025-06-16 RX ORDER — CELECOXIB 200 MG/1
200 CAPSULE ORAL DAILY PRN
Qty: 30 CAPSULE | Refills: 0 | Status: SHIPPED | OUTPATIENT
Start: 2025-06-16

## 2025-06-16 NOTE — TELEPHONE ENCOUNTER
Requested Prescriptions     Pending Prescriptions Disp Refills    CELECOXIB 200 MG Oral Cap [Pharmacy Med Name: CELECOXIB 200 MG CAPSULE] 30 capsule 0     Sig: TAKE 1 CAPSULE BY MOUTH DAILY AS NEEDED FOR PAIN.       FILLED- 5/20/25  LOV- 5/6/25    No future appointments.

## 2025-07-15 RX ORDER — CELECOXIB 200 MG/1
200 CAPSULE ORAL DAILY PRN
Qty: 30 CAPSULE | Refills: 2 | Status: SHIPPED | OUTPATIENT
Start: 2025-07-15

## 2025-07-17 NOTE — TELEPHONE ENCOUNTER
Patient's Celebrex refilled.  In the future I would like him to talk with his doctor about the appropriateness of continuing on Celebrex.  She will know better as to whether he should stay on this long-term.

## 2025-08-13 ENCOUNTER — TELEPHONE (OUTPATIENT)
Facility: LOCATION | Age: 65
End: 2025-08-13

## (undated) DIAGNOSIS — Z00.00 LABORATORY EXAM ORDERED AS PART OF ROUTINE GENERAL MEDICAL EXAMINATION: Primary | ICD-10-CM

## (undated) DIAGNOSIS — Z13.89 SCREENING FOR GENITOURINARY CONDITION: ICD-10-CM

## (undated) DIAGNOSIS — Z12.5 SCREENING FOR MALIGNANT NEOPLASM OF PROSTATE: ICD-10-CM

## (undated) DEVICE — PROXIMATE RH ROTATING HEAD SKIN STAPLERS (35 WIDE) CONTAINS 35 STAINLESS STEEL STAPLES: Brand: PROXIMATE

## (undated) DEVICE — STERILE POLYISOPRENE POWDER-FREE SURGICAL GLOVES: Brand: PROTEXIS

## (undated) DEVICE — STERILE HOOK LOCK LATEX FREE ELASTIC BANDAGE 4INX5YD: Brand: HOOK LOCK™

## (undated) DEVICE — Device

## (undated) DEVICE — PADDING CAST SOFT ROLL 4\"

## (undated) DEVICE — STERILE HOOK LOCK LATEX FREE ELASTIC BANDAGE 6INX5YD: Brand: HOOK LOCK™

## (undated) DEVICE — GAMMEX® PI HYBRID SIZE 8.5, STERILE POWDER-FREE SURGICAL GLOVE, POLYISOPRENE AND NEOPRENE BLEND: Brand: GAMMEX

## (undated) DEVICE — FAN SPRAY KIT: Brand: PULSAVAC®

## (undated) DEVICE — SUTURE VICRYL 1 OS-6

## (undated) DEVICE — KENDALL SCD EXPRESS SLEEVES, KNEE LENGTH, MEDIUM: Brand: KENDALL SCD

## (undated) DEVICE — STANDARD HYPODERMIC NEEDLE,POLYPROPYLENE HUB: Brand: MONOJECT

## (undated) DEVICE — UNDYED BRAIDED (POLYGLACTIN 910), SYNTHETIC ABSORBABLE SUTURE: Brand: COATED VICRYL

## (undated) DEVICE — SUTURE ETHILON 3-0 PS-2

## (undated) DEVICE — CONVERTORS STOCKINETTE: Brand: CONVERTORS

## (undated) DEVICE — GOWN SURG AERO CHROME XXL

## (undated) DEVICE — LOWER EXTREMITY CDS-LF: Brand: MEDLINE INDUSTRIES, INC.

## (undated) DEVICE — SOL  .9 1000ML BTL

## (undated) DEVICE — GAUZE SPONGES,12 PLY: Brand: CURITY

## (undated) DEVICE — SUTURE MONOCRYL 2-0 SH

## (undated) DEVICE — SUTURE VICRYL 2-0 FS-1

## (undated) DEVICE — GAMMEX® NON-LATEX PI ORTHO SIZE 8.5, STERILE POLYISOPRENE POWDER-FREE SURGICAL GLOVE: Brand: GAMMEX

## (undated) DEVICE — SUTURE VICRYL 0 CT-1

## (undated) DEVICE — CHLORAPREP 26ML APPLICATOR

## (undated) DEVICE — OCCLUSIVE GAUZE STRIP OVERWRAP,3% BISMUTH TRIBROMOPHENATE IN PETROLATUM BLEND: Brand: XEROFORM

## (undated) DEVICE — 3M™ STERI-DRAPE™ U-DRAPE 1015: Brand: STERI-DRAPE™

## (undated) DEVICE — 3M™ IOBAN™ 2 ANTIMICROBIAL INCISE DRAPE 6650EZ: Brand: IOBAN™ 2

## (undated) DEVICE — GAUZE SPONGES,USP TYPE VII GAUZE, 12 PLY: Brand: CURITY

## (undated) DEVICE — REM POLYHESIVE ADULT PATIENT RETURN ELECTRODE: Brand: VALLEYLAB

## (undated) DEVICE — PROXIMATE SKIN STAPLERS (35 WIDE) CONTAINS 35 STAINLESS STEEL STAPLES (FIXED HEAD): Brand: PROXIMATE

## (undated) DEVICE — HYDROGEN PEROXIDE SOL 4 OZ

## (undated) DEVICE — PADDING CAST WYTEX 3IN

## (undated) DEVICE — 3M™ TEGADERM™ TRANSPARENT FILM DRESSING, 1626W, 4 IN X 4-3/4 IN (10 CM X 12 CM), 50 EACH/CARTON, 4 CARTON/CASE: Brand: 3M™ TEGADERM™

## (undated) DEVICE — SUTURE PDS II 0 CT-1

## (undated) NOTE — IP AVS SNAPSHOT
[address]            (For Outpatient Use Only) Initial Admit Date: 8/28/2019   Inpt/Obs Admit Date: Inpt: 8/28/19 / Obs: N/A   Discharge Date:    Ailyn Song:  [de-identified]   MRN: [de-identified]   CSN: [id number]   CEID: [id number] Subscriber Name:  Patricia Ortega :    Subscriber ID:  Pt Rel to Subscriber:    Hospital Account Financial Class: Managed Care    2019

## (undated) NOTE — LETTER
Beverley Gutierrez 182 [address] [facility] [address], 209 [facility]     PICC LINE INSERTION CONSENT     I agree to have a Peripherally Inserted Central Catheter (PICC) placed in my arm.    1. The PICC insertion procedure, care, maintenance, risks, benefits, and c goals; and potential problems that might occur during recuperation.  They also discussed reasonable alternatives to the PICC, including risks, benefits, and side effects related to the alternatives and risks related to not receiving this procedure      ____

## (undated) NOTE — LETTER
Beverley Gutierrez 182 6 [facility]  [place], [address] [place] [address]    Consent for Operation  Date: __________________                                Time: _______________    1.  I authorize the performance upon Addy Sharma the following operation:  Procedur procedure has been videotaped, the surgeon will obtain the original videotape. The hospital will not be responsible for storage or maintenance of this tape.   7. For the purpose of advancing medical education, I consent to the admittance of observers to the STATEMENTS REQUIRING INSERTION OR COMPLETION WERE FILLED IN.     Signature of Patient:   ___________________________    When the patient is a minor or mentally incompetent to give consent:  Signature of person authorized to consent for patient: ____________ supplements, and pills I can buy without a prescription (including street drugs/illegal medications). Failure to inform my anesthesiologist about these medicines may increase my risk of anesthetic complications. iv.  If I am allergic to anything or have ha Anesthesiologist Signature     Date   Time  I have discussed the procedure and information above with the patient (or patient’s representative) and answered their questions. The patient or their representative has agreed to have anesthesia services.     ___

## (undated) NOTE — LETTER
Beverley Gutierrez 182 6 [address] [address]  [place], [address] [place] [address]    Consent for Operation  Date: __________________                                Time: _______________    1.  I authorize the performance upon Jennifersharath Cooley the following operation:  Procedur procedure has been videotaped, the surgeon will obtain the original videotape. The hospital will not be responsible for storage or maintenance of this tape.   7. For the purpose of advancing medical education, I consent to the admittance of observers to the STATEMENTS REQUIRING INSERTION OR COMPLETION WERE FILLED IN.     Signature of Patient:   ___________________________    When the patient is a minor or mentally incompetent to give consent:  Signature of person authorized to consent for patient: ____________ supplements, and pills I can buy without a prescription (including street drugs/illegal medications). Failure to inform my anesthesiologist about these medicines may increase my risk of anesthetic complications. iv.  If I am allergic to anything or have ha Anesthesiologist Signature     Date   Time  I have discussed the procedure and information above with the patient (or patient’s representative) and answered their questions. The patient or their representative has agreed to have anesthesia services.     ___

## (undated) NOTE — ED AVS SNAPSHOT
Sarina Santana   MRN: [id number]    Department:  [facility] Emergency Department   Date of Visit:  8/9/2019           Disclosure     Insurance plans vary and the physician(s) referred by the ER may not be covered by your plan.  Please contact you tell this physician (or your personal doctor if your instructions are to return to your personal doctor) about any new or lasting problems. The primary care or specialist physician will see patients referred from the [facility] Emergency Department.  Pedrito Pierre

## (undated) NOTE — LETTER
Beverley Gutierrez [address]  [address] [place] [address], [address] [place] [address]    Consent for Operation  Date: __________________                                Time: _______________    1.  I authorize the performance upon Hungarian Juvenal the following operation:  Procedjoy procedure has been videotaped, the surgeon will obtain the original videotape. The hospital will not be responsible for storage or maintenance of this tape.   7. For the purpose of advancing medical education, I consent to the admittance of observers to the STATEMENTS REQUIRING INSERTION OR COMPLETION WERE FILLED IN.     Signature of Patient:   ___________________________    When the patient is a minor or mentally incompetent to give consent:  Signature of person authorized to consent for patient: ____________ supplements, and pills I can buy without a prescription (including street drugs/illegal medications). Failure to inform my anesthesiologist about these medicines may increase my risk of anesthetic complications. iv.  If I am allergic to anything or have ha Anesthesiologist Signature     Date   Time  I have discussed the procedure and information above with the patient (or patient’s representative) and answered their questions. The patient or their representative has agreed to have anesthesia services.     ___

## (undated) NOTE — LETTER
Beverley Gutierrez 182 6 13[facility]  [place], 209 [facility]    Consent for Operation  Date: __________________                                Time: _______________    1.  I authorize the performance upon Tran Kern the following operation:  Procedur procedure has been videotaped, the surgeon will obtain the original videotape. The hospital will not be responsible for storage or maintenance of this tape.   7. For the purpose of advancing medical education, I consent to the admittance of observers to the STATEMENTS REQUIRING INSERTION OR COMPLETION WERE FILLED IN.     Signature of Patient:   ___________________________    When the patient is a minor or mentally incompetent to give consent:  Signature of person authorized to consent for patient: ____________ supplements, and pills I can buy without a prescription (including street drugs/illegal medications). Failure to inform my anesthesiologist about these medicines may increase my risk of anesthetic complications. iv.  If I am allergic to anything or have ha Anesthesiologist Signature     Date   Time  I have discussed the procedure and information above with the patient (or patient’s representative) and answered their questions. The patient or their representative has agreed to have anesthesia services.     ___

## (undated) NOTE — LETTER
Beverley Gutierrez 182 6 [address] [address]  [place], [address] [place] [address]    Consent for Operation  Date: __________________                                Time: _______________    1.  I authorize the performance upon Donbrian Carry the following operation:  Procedjoy obtain the original videotape. [facility] will not be responsible for storage or maintenance of this tape. 7. For the purpose of advancing medical education, I consent to the admittance of observers to the Operating Room.   8. I authorize the use of any ___________________________    When the patient is a minor or mentally incompetent to give consent:  Signature of person authorized to consent for patient: ___________________________   Relationship to patient: _____________________________________________ medicines may increase my risk of anesthetic complications. iv. If I am allergic to anything or have had a reaction to anesthesia before. 3. I understand how the anesthesia medicine will help me (benefits).   4. I understand that with any type of anesthes or their representative has agreed to have anesthesia services.     _____________________________________________________________________________  Witness        Date   Time  I have verified that the signature is that of the patient or patient’s representat

## (undated) NOTE — LETTER
[facility]  Kike Fermincameron 61 39[address] [facility], [address] [place] [address]    Consent for Operation  08/21/2019  Date: __________________    Time: ______1644_________    1.  I authorize the performance upon Shanta Carry the following operation: procedure has been videotaped, the surgeon will obtain the original videotape. The hospital will not be responsible for storage or maintenance of this tape.     6. For the purpose of advancing medical education, I consent to the admittance of observers to t STATEMENTS REQUIRING INSERTION OR COMPLETION WERE FILLED IN.     Signature of Patient:   ___________________________    When the patient is a minor or mentally incompetent to give consent:  Signature of person authorized to consent for patient: ____________ · If I am allergic to anything or have had a reaction to anesthesia before. 3. I understand how the anesthesia medicine will help me (benefits). 4. I understand that with any type of anesthesia medicine there are risks:  a.  The most common risks are: their representative has agreed to have anesthesia services.     _____________________________________________________________________________  Witness        Date   Time  I have verified that the signature is that of the patient or patient’s representative

## (undated) NOTE — IP AVS SNAPSHOT
Patient Demographics     Address  [place] [address] Phone  [phone] [facility])  [phone] (Mobile) *Preferred* E-mail Address  [email or web address]      Emergency Contact(s)     Name Relation Home Work Mobile    Chelle Galeana Spouse [phone] Where to Get Your Medications      Please  your prescriptions at the location directed by your doctor or nurse    Bring a paper prescription for each of these medications  meropenem 1 g Solr  oxyCODONE-acetaminophen 5-325 MG Tabs           [id number] RDW 11.8 11.0 - 15.0 % — [facility]   RDW-SD 38.4 35.1 - 46.3 fL — [facility]            Testing Performed By     Lab - Abbreviation Name Director Address Valid Date Range    [facility] 106 LAB Nallely Jimenez  S.  [address] Bever[address] FUNGUS CULTURE AND SMEAR [[id number]] Collected:  08/28/19 1827    Order Status:  Sent Lab Status: In process Updated:  08/30/19 2634    Specimen:  Tissue from Knee, right     Narrative:        The following orders were created for panel order FUNGUS CULTU Order Status:  Sent Lab Status: In process Updated:  08/29/19 1348    Specimen:  Tissue from Knee, right     Narrative: The following orders were created for panel order AFB CULTURE AND SMEAR.   Procedure                               Abnormality Specimen: Body fluid, unspecified from Knee, right     Narrative: The following orders were created for panel order FUNGUS CULTURE AND SMEAR.   Procedure                               Abnormality         Status                     --------- AFB CULTURE(P) Once [[id number]] Collected:  08/28/19 1827    Order Status:  Sent Lab Status:   In process Updated:  08/28/19 1942    Specimen:  Tissue from Knee, right     AFB CULTURE(P) Once [[id number]] Collected:  08/28/19 1822    Order Status:  Sent Lab by him in the clinic again found to have a knee effusion which he aspirated and showed concerns for recurrent infection. He was again formally washed out on 8/21/2019 by him. Patient has been on antibiotics and has been followed by ID.   His initial cultu • COLONOSCOPY  2013   • COLONOSCOPY N/A 11/27/2013    Performed by Sarai Rider MD at [address] [place] [address] Right 8/19/2013    Performed by Magnus Baez MD at [facility] Right 8/21 Lymphatic:  Denies swollen glands   Musculoskeletal:  As described in HPI   PHYSICAL EXAMINATION[AC.1]      08/28/19  1700   BP: 119/67   Pulse: 75   Resp: 16   Temp: 98.8 °F (37.1 °C)[AC.2]       General: Well-nourished, no acute distress, normal affect, on currently.     The risk and benefits of surgery including bleeding, infection, neurovascular injury, recurrent infection, chronic pain, chronic stiffness, the development of post septic arthritis, were all explained to the patient he understood this and FREDA and was admitted 8/5, and underwent I/D right knee arthrotomy with Dr. Shruthi Yuen on 8/6/19, cultures also showing Angeline Forge. He was discharged on iv ceftriaxone.  He returned to ED on 8/9 due to hives, and was switched to Aztreonam denies needing epi pen  Ceftriaxone             HIVES, SWELLING    Comment:Swelling to joints    Medications:    Current Facility-Administered Medications:   •  oxyCODONE-acetaminophen (PERCOCET) 5-325 MG per tab 1 tablet, 1 tablet, Oral, Q4H ID Musculoskeletal: right knee postop dressing/hemovac      Laboratory Data:      Recent Labs   Lab 08/22/19  1110 08/29/19  0550   RBC 4.00* 3.89*   HGB 11.8* 11.4*   HCT 35.4* 34.2*   MCV 88.5 87.9   MCH 29.5 29.3   MCHC 33.3 33.3   RDW 11.8 11.9   NEPRELIM 4+ Neutrophils seen [facility]      This is a cytocentrifuged smear.  [facility]      Gram Negative Rods Origo.by is from Gram stain of Bottle          Susceptibility      Leclercia adecarboxylata     Not Specified    Ampicillin <=2  Sensitive Electronically signed by Daquan Tamez MD on 8/29/2019  8:43 AM   Attribution Key    [email or web address]. 1 - Daquan Tamez MD on 8/29/2019  8:25 AM                     D/C Summary    No notes of this type exist for this encounter.      Physical Therapy Notes (la

## (undated) NOTE — LETTER
Beverley Gutierrez 182 357 [facility] [address], 209 [facility]     PICC LINE INSERTION CONSENT     I agree to have a Peripherally Inserted Central Catheter (PICC) placed in my arm.    1. The PICC insertion procedure, care, maintenance, risks, benefits, and c also discussed reasonable alternatives to the PICC, including risks, benefits, and side effects related to the alternatives and risks related to not receiving this procedure      _____________________ ___________ ____________________________________   Date

## (undated) NOTE — LETTER
Date & Time: 8/9/2019, 12:52 PM  Patient: Violeta Shall  Encounter Provider(s):    Bird Sousa MD       To Whom It May Concern:    Roslyn Szymanski was seen and treated in our department on 8/9/2019. He should not return to work until 8/12/19.

## (undated) NOTE — ED AVS SNAPSHOT
Christelle Chance   MRN: [id number]    Department:  [facility] Emergency Department   Date of Visit:  8/2/2019           Disclosure     Insurance plans vary and the physician(s) referred by the ER may not be covered by your plan.  Please contact you tell this physician (or your personal doctor if your instructions are to return to your personal doctor) about any new or lasting problems. The primary care or specialist physician will see patients referred from the [facility] Emergency Department.  Daniel Puri

## (undated) NOTE — Clinical Note
FYI, TCM call made, see notes. NCM attempted to schedule TCM HFU patient states he will call to schedule himself. Patient scheduled HFU on 8/9/19, KARLIE changed visit type to TCM HFU.

## (undated) NOTE — LETTER
[facility] [address]  [address] [address]  Dept: [phone]  Dept Fax: [phone]         July 27, 2019    Patient: Jonny Adame   YOB: 1960   Date of Visit: 7/27/2019       To Whom It May C